# Patient Record
Sex: MALE | Race: WHITE
[De-identification: names, ages, dates, MRNs, and addresses within clinical notes are randomized per-mention and may not be internally consistent; named-entity substitution may affect disease eponyms.]

---

## 2017-01-09 ENCOUNTER — HOSPITAL ENCOUNTER (OUTPATIENT)
Dept: HOSPITAL 45 - C.LABSPEC | Age: 52
Discharge: HOME | End: 2017-01-09
Attending: INTERNAL MEDICINE
Payer: COMMERCIAL

## 2017-01-09 DIAGNOSIS — Z51.81: ICD-10-CM

## 2017-01-09 DIAGNOSIS — Z79.899: ICD-10-CM

## 2017-01-09 DIAGNOSIS — E78.5: ICD-10-CM

## 2017-01-09 DIAGNOSIS — E11.9: Primary | ICD-10-CM

## 2017-01-09 LAB
ANION GAP SERPL CALC-SCNC: 9 MMOL/L (ref 3–11)
BUN SERPL-MCNC: 7 MG/DL (ref 7–18)
BUN/CREAT SERPL: 11.5 (ref 10–20)
CALCIUM SERPL-MCNC: 9.1 MG/DL (ref 8.5–10.1)
CHLORIDE SERPL-SCNC: 102 MMOL/L (ref 98–107)
CHOLEST/HDLC SERPL: 3.9 {RATIO}
CO2 SERPL-SCNC: 30 MMOL/L (ref 21–32)
CREAT SERPL-MCNC: 0.6 MG/DL (ref 0.6–1.4)
EST. AVERAGE GLUCOSE BLD GHB EST-MCNC: 148 MG/DL
GLUCOSE SERPL-MCNC: 169 MG/DL (ref 70–99)
GLUCOSE UR QL: 50 MG/DL
NITRITE UR QL STRIP: 203 MG/DL (ref 0–150)
PH UR: 196 MG/DL (ref 0–200)
POTASSIUM SERPL-SCNC: 3.9 MMOL/L (ref 3.5–5.1)
SODIUM SERPL-SCNC: 141 MMOL/L (ref 136–145)
VERY LOW DENSITY LIPOPROT CALC: 41 MG/DL

## 2017-05-09 ENCOUNTER — HOSPITAL ENCOUNTER (OUTPATIENT)
Dept: HOSPITAL 45 - C.LABSPEC | Age: 52
Discharge: HOME | End: 2017-05-09
Attending: INTERNAL MEDICINE
Payer: COMMERCIAL

## 2017-05-09 DIAGNOSIS — E11.65: Primary | ICD-10-CM

## 2017-05-09 DIAGNOSIS — E78.5: ICD-10-CM

## 2017-05-09 DIAGNOSIS — G40.909: ICD-10-CM

## 2017-05-09 LAB
ALBUMIN/GLOB SERPL: 1.1 {RATIO} (ref 0.9–2)
ALP SERPL-CCNC: 176 U/L (ref 45–117)
ALT SERPL-CCNC: 41 U/L (ref 12–78)
ANION GAP SERPL CALC-SCNC: 9 MMOL/L (ref 3–11)
AST SERPL-CCNC: 18 U/L (ref 15–37)
BASOPHILS # BLD: 0.01 K/UL (ref 0–0.2)
BASOPHILS NFR BLD: 0.2 %
BUN SERPL-MCNC: 10 MG/DL (ref 7–18)
BUN/CREAT SERPL: 17.7 (ref 10–20)
CALCIUM SERPL-MCNC: 8.7 MG/DL (ref 8.5–10.1)
CHLORIDE SERPL-SCNC: 103 MMOL/L (ref 98–107)
CHOLEST/HDLC SERPL: 4.1 {RATIO}
CO2 SERPL-SCNC: 27 MMOL/L (ref 21–32)
COMPLETE: YES
CREAT SERPL-MCNC: 0.56 MG/DL (ref 0.6–1.4)
EOSINOPHIL NFR BLD AUTO: 201 K/UL (ref 130–400)
EST. AVERAGE GLUCOSE BLD GHB EST-MCNC: 151 MG/DL
GLOBULIN SER-MCNC: 3.3 GM/DL (ref 2.5–4)
GLUCOSE SERPL-MCNC: 151 MG/DL (ref 70–99)
GLUCOSE UR QL: 46 MG/DL
HCT VFR BLD CALC: 46.6 % (ref 42–52)
IG%: 0.3 %
IMM GRANULOCYTES NFR BLD AUTO: 23.4 %
LYMPHOCYTES # BLD: 1.47 K/UL (ref 1.2–3.4)
MCH RBC QN AUTO: 30.2 PG (ref 25–34)
MCHC RBC AUTO-ENTMCNC: 33.5 G/DL (ref 32–36)
MCV RBC AUTO: 90.1 FL (ref 80–100)
MONOCYTES NFR BLD: 10 %
NEUTROPHILS # BLD AUTO: 1 %
NEUTROPHILS NFR BLD AUTO: 65.1 %
NITRITE UR QL STRIP: 209 MG/DL (ref 0–150)
PH UR: 190 MG/DL (ref 0–200)
PMV BLD AUTO: 11.3 FL (ref 7.4–10.4)
POTASSIUM SERPL-SCNC: 3.9 MMOL/L (ref 3.5–5.1)
RBC # BLD AUTO: 5.17 M/UL (ref 4.7–6.1)
SODIUM SERPL-SCNC: 139 MMOL/L (ref 136–145)
VERY LOW DENSITY LIPOPROT CALC: 42 MG/DL
WBC # BLD AUTO: 6.29 K/UL (ref 4.8–10.8)

## 2017-06-13 ENCOUNTER — HOSPITAL ENCOUNTER (OUTPATIENT)
Dept: HOSPITAL 45 - C.RAD | Age: 52
Discharge: HOME | End: 2017-06-13
Attending: INTERNAL MEDICINE
Payer: COMMERCIAL

## 2017-06-13 DIAGNOSIS — R05: Primary | ICD-10-CM

## 2017-06-13 DIAGNOSIS — M54.6: ICD-10-CM

## 2017-06-13 NOTE — DIAGNOSTIC IMAGING REPORT
CHEST 2 VIEWS ROUTINE



CLINICAL HISTORY: COUGH, PAIN IN UPPER BACK dyspnea



COMPARISON STUDY:  4/27/2013



FINDINGS: The bones soft tissues and hemidiaphragms are normal. The

cardiomediastinal silhouette is normal. The lungs are clear. The pulmonary

vasculature is normal. 



IMPRESSION:  Negative chest. 









Electronically signed by:  Ben Rojas M.D.

6/13/2017 3:26 PM



Dictated Date/Time:  6/13/2017 3:26 PM

## 2017-06-13 NOTE — DIAGNOSTIC IMAGING REPORT
THORACIC SPINE 3 VIEWS



HISTORY: Dyspnea  COUGH, PAIN IN UPPER BACK



COMPARISON: None.



FINDINGS: There is no fracture.  No subluxation. Moderate degenerative disc

change throughout



IMPRESSION:  

Moderate degenerative disc change. No acute bony abnormality.







Electronically signed by:  Ben Rojas M.D.

6/13/2017 3:30 PM



Dictated Date/Time:  6/13/2017 3:29 PM

## 2017-09-08 ENCOUNTER — HOSPITAL ENCOUNTER (OUTPATIENT)
Dept: HOSPITAL 45 - C.LABSPEC | Age: 52
Discharge: HOME | End: 2017-09-08
Attending: INTERNAL MEDICINE
Payer: COMMERCIAL

## 2017-09-08 DIAGNOSIS — Z12.11: Primary | ICD-10-CM

## 2017-09-19 ENCOUNTER — HOSPITAL ENCOUNTER (OUTPATIENT)
Dept: HOSPITAL 45 - C.LABSPEC | Age: 52
Discharge: HOME | End: 2017-09-19
Attending: INTERNAL MEDICINE
Payer: COMMERCIAL

## 2017-09-19 DIAGNOSIS — E11.9: Primary | ICD-10-CM

## 2017-09-19 DIAGNOSIS — E78.5: ICD-10-CM

## 2017-09-19 LAB
ANION GAP SERPL CALC-SCNC: 8 MMOL/L (ref 3–11)
BUN SERPL-MCNC: 8 MG/DL (ref 7–18)
BUN/CREAT SERPL: 16.3 (ref 10–20)
CALCIUM SERPL-MCNC: 9.1 MG/DL (ref 8.5–10.1)
CHLORIDE SERPL-SCNC: 102 MMOL/L (ref 98–107)
CHOLEST/HDLC SERPL: 5.3 {RATIO}
CO2 SERPL-SCNC: 28 MMOL/L (ref 21–32)
CREAT SERPL-MCNC: 0.48 MG/DL (ref 0.6–1.4)
EST. AVERAGE GLUCOSE BLD GHB EST-MCNC: 137 MG/DL
GLUCOSE SERPL-MCNC: 143 MG/DL (ref 70–99)
GLUCOSE UR QL: 38 MG/DL
NITRITE UR QL STRIP: 172 MG/DL (ref 0–150)
PH UR: 201 MG/DL (ref 0–200)
POTASSIUM SERPL-SCNC: 4 MMOL/L (ref 3.5–5.1)
SODIUM SERPL-SCNC: 138 MMOL/L (ref 136–145)
VERY LOW DENSITY LIPOPROT CALC: 34 MG/DL

## 2017-10-10 ENCOUNTER — HOSPITAL ENCOUNTER (OUTPATIENT)
Dept: HOSPITAL 45 - C.LABSPEC | Age: 52
Discharge: HOME | End: 2017-10-10
Attending: INTERNAL MEDICINE
Payer: COMMERCIAL

## 2017-10-10 DIAGNOSIS — E11.9: Primary | ICD-10-CM

## 2017-10-10 LAB
CREAT UR-MCNC: 210 MG/DL
RATIO: 7.4 MCG/MG (ref 0–30)

## 2018-01-10 ENCOUNTER — HOSPITAL ENCOUNTER (OUTPATIENT)
Dept: HOSPITAL 45 - C.LABSPEC | Age: 53
Discharge: HOME | End: 2018-01-10
Attending: INTERNAL MEDICINE
Payer: COMMERCIAL

## 2018-01-10 DIAGNOSIS — G40.909: ICD-10-CM

## 2018-01-10 DIAGNOSIS — I70.91: ICD-10-CM

## 2018-01-10 DIAGNOSIS — E11.9: Primary | ICD-10-CM

## 2018-01-10 LAB
ALBUMIN SERPL-MCNC: 3.4 GM/DL (ref 3.4–5)
ALP SERPL-CCNC: 226 U/L (ref 45–117)
ALT SERPL-CCNC: 42 U/L (ref 12–78)
AST SERPL-CCNC: 19 U/L (ref 15–37)
BASOPHILS # BLD: 0.01 K/UL (ref 0–0.2)
BASOPHILS NFR BLD: 0.2 %
BUN SERPL-MCNC: 7 MG/DL (ref 7–18)
CALCIUM SERPL-MCNC: 9.1 MG/DL (ref 8.5–10.1)
CO2 SERPL-SCNC: 30 MMOL/L (ref 21–32)
CREAT SERPL-MCNC: 0.51 MG/DL (ref 0.6–1.4)
EOS ABS #: 0.02 K/UL (ref 0–0.5)
EOSINOPHIL NFR BLD AUTO: 206 K/UL (ref 130–400)
GLUCOSE SERPL-MCNC: 136 MG/DL (ref 70–99)
HBA1C MFR BLD: 6.4 % (ref 4.5–5.6)
HCT VFR BLD CALC: 43.6 % (ref 42–52)
HGB BLD-MCNC: 14.4 G/DL (ref 14–18)
IG#: 0.02 K/UL (ref 0–0.02)
IMM GRANULOCYTES NFR BLD AUTO: 17.9 %
LDL CHOLESTEROL (DIRECT): 146 MG/DL
LYMPHOCYTES # BLD: 1.06 K/UL (ref 1.2–3.4)
MCH RBC QN AUTO: 29.1 PG (ref 25–34)
MCHC RBC AUTO-ENTMCNC: 33 G/DL (ref 32–36)
MCV RBC AUTO: 88.3 FL (ref 80–100)
MONO ABS #: 0.76 K/UL (ref 0.11–0.59)
MONOCYTES NFR BLD: 12.9 %
NEUT ABS #: 4.04 K/UL (ref 1.4–6.5)
NEUTROPHILS # BLD AUTO: 0.3 %
NEUTROPHILS NFR BLD AUTO: 68.4 %
PH UR: 202 MG/DL (ref 0–200)
PMV BLD AUTO: 10.3 FL (ref 7.4–10.4)
POTASSIUM SERPL-SCNC: 4 MMOL/L (ref 3.5–5.1)
PROT SERPL-MCNC: 7.3 GM/DL (ref 6.4–8.2)
RED CELL DISTRIBUTION WIDTH CV: 14.2 % (ref 11.5–14.5)
RED CELL DISTRIBUTION WIDTH SD: 45.7 FL (ref 36.4–46.3)
SODIUM SERPL-SCNC: 136 MMOL/L (ref 136–145)
WBC # BLD AUTO: 5.91 K/UL (ref 4.8–10.8)

## 2018-02-21 ENCOUNTER — HOSPITAL ENCOUNTER (OUTPATIENT)
Dept: HOSPITAL 45 - C.LABSPEC | Age: 53
Discharge: HOME | End: 2018-02-21
Attending: INTERNAL MEDICINE
Payer: COMMERCIAL

## 2018-02-21 DIAGNOSIS — R63.4: ICD-10-CM

## 2018-02-21 DIAGNOSIS — R10.9: ICD-10-CM

## 2018-02-21 DIAGNOSIS — E11.9: Primary | ICD-10-CM

## 2018-02-21 LAB
ALBUMIN SERPL-MCNC: 3 GM/DL (ref 3.4–5)
ALP SERPL-CCNC: 186 U/L (ref 45–117)
ALT SERPL-CCNC: 129 U/L (ref 12–78)
AST SERPL-CCNC: 93 U/L (ref 15–37)
BASOPHILS # BLD: 0.01 K/UL (ref 0–0.2)
BASOPHILS NFR BLD: 0.2 %
BUN SERPL-MCNC: 9 MG/DL (ref 7–18)
CALCIUM SERPL-MCNC: 9 MG/DL (ref 8.5–10.1)
CO2 SERPL-SCNC: 29 MMOL/L (ref 21–32)
CREAT SERPL-MCNC: 0.62 MG/DL (ref 0.6–1.4)
EOS ABS #: 0.01 K/UL (ref 0–0.5)
EOSINOPHIL NFR BLD AUTO: 287 K/UL (ref 130–400)
GLUCOSE SERPL-MCNC: 74 MG/DL (ref 70–99)
HCT VFR BLD CALC: 40.2 % (ref 42–52)
HGB BLD-MCNC: 13.2 G/DL (ref 14–18)
IG#: 0.02 K/UL (ref 0–0.02)
IMM GRANULOCYTES NFR BLD AUTO: 14.6 %
LIPASE: 132 U/L (ref 73–393)
LYMPHOCYTES # BLD: 0.86 K/UL (ref 1.2–3.4)
MCH RBC QN AUTO: 28 PG (ref 25–34)
MCHC RBC AUTO-ENTMCNC: 32.8 G/DL (ref 32–36)
MCV RBC AUTO: 85.4 FL (ref 80–100)
MONO ABS #: 0.84 K/UL (ref 0.11–0.59)
MONOCYTES NFR BLD: 14.2 %
NEUT ABS #: 4.17 K/UL (ref 1.4–6.5)
NEUTROPHILS # BLD AUTO: 0.2 %
NEUTROPHILS NFR BLD AUTO: 70.5 %
PMV BLD AUTO: 9.8 FL (ref 7.4–10.4)
POTASSIUM SERPL-SCNC: 3.9 MMOL/L (ref 3.5–5.1)
PROT SERPL-MCNC: 7.6 GM/DL (ref 6.4–8.2)
RED CELL DISTRIBUTION WIDTH CV: 14.1 % (ref 11.5–14.5)
RED CELL DISTRIBUTION WIDTH SD: 44.7 FL (ref 36.4–46.3)
SODIUM SERPL-SCNC: 132 MMOL/L (ref 136–145)
WBC # BLD AUTO: 5.91 K/UL (ref 4.8–10.8)

## 2018-02-22 ENCOUNTER — HOSPITAL ENCOUNTER (OUTPATIENT)
Dept: HOSPITAL 45 - C.CTS | Age: 53
Discharge: HOME | End: 2018-02-22
Attending: INTERNAL MEDICINE
Payer: COMMERCIAL

## 2018-02-22 ENCOUNTER — HOSPITAL ENCOUNTER (OUTPATIENT)
Dept: HOSPITAL 45 - C.LABSPEC | Age: 53
Discharge: HOME | End: 2018-02-22
Attending: INTERNAL MEDICINE
Payer: COMMERCIAL

## 2018-02-22 DIAGNOSIS — R63.4: ICD-10-CM

## 2018-02-22 DIAGNOSIS — R10.9: Primary | ICD-10-CM

## 2018-02-22 DIAGNOSIS — C85.90: Primary | ICD-10-CM

## 2018-02-22 LAB
HBA1C MFR BLD: 6.6 % (ref 4.5–5.6)
INR PPP: 1 (ref 0.9–1.1)
PTT PATIENT: 29.6 SECONDS (ref 21–31)

## 2018-02-22 NOTE — DIAGNOSTIC IMAGING REPORT
ABD/PELVIS IV AND ORAL CONT



CT DOSE: 915.13 mGycm



HISTORY: Pain.  ABDOMINAL PAIN, WEIGHT LOSS



TECHNIQUE: Multiaxial CT images of the abdomen and pelvis were performed

following the use of intravenous and oral contrast.  A dose lowering technique

was utilized adhering to the principles of ALARA.





COMPARISON STUDY: 11/29/2011



FINDINGS: Lung bases are considered clear. Liver is uniform throughout. Spleen

is uniform with no evidence for enlargement.



There are findings of marked adenopathy within the retroperitoneal and

periaortic region. The largest node measures 6.2 cm and displaces the pancreatic

head and uncinate process and anterior fashion.



Bulky smaller nodes are identified throughout the para-aortic and

retroperitoneal region extending to the level of the aortic bifurcation. The

kidneys enhance uniformly. No evidence for hydronephrosis.



Bowel pattern is considered nonobstructive. The appendix is normal. Subtle

infiltration of the root of the mesentery is also present. Graft evaluation of

the pelvis shows the bladder to be midline. Colonic bowel pattern is considered

nonobstructive. Inguinal regions are unremarkable.



IMPRESSION: 



1. Massive adenopathy of the upper abdominal, para, and retroperitoneal regions.





2. This is considerable change compared to the prior study of 11/29/2011. 

3. Lymphoma is considered the diagnosis of exclusion.









The above report was generated using voice recognition software.  It may contain

grammatical, syntax or spelling errors.







Electronically signed by:  Ben Rojas M.D.

2/22/2018 2:35 PM



Dictated Date/Time:  2/22/2018 2:21 PM

## 2018-02-27 ENCOUNTER — HOSPITAL ENCOUNTER (OUTPATIENT)
Dept: HOSPITAL 45 - C.CTS | Age: 53
Discharge: HOME | End: 2018-02-27
Attending: INTERNAL MEDICINE
Payer: COMMERCIAL

## 2018-02-27 DIAGNOSIS — C85.90: Primary | ICD-10-CM

## 2018-02-27 NOTE — DIAGNOSTIC IMAGING REPORT
CT OF THE CHEST WITH IV CONTRAST



CLINICAL HISTORY: LYMPHOMA    



COMPARISON STUDY:  Chest x-ray dated 6/13/2017 



TECHNIQUE:  Following the IV administration of 95 mL of Optiray-320, CT of the

thorax was performed from the thoracic inlet to the lung bases. Images are

reviewed in the axial, sagittal, and coronal planes. IV contrast was

administered without complication.  A dose lowering technique was utilized

adhering to the principles of ALARA.





CT DOSE: 407.24 mGy.cm



FINDINGS:



Thyroid: Imaged portions of the thyroid gland are normal in appearance.



Thoracic aorta: The thoracic aorta is normal in course and caliber, noting

standard 3-vessel arch anatomy. No aneurysm or dissection is seen.



Pulmonary vasculature: The pulmonary trunk is normal in caliber. There are no

central filling defects identified to suggest pulmonary embolus. Note that this

examination was not protocoled for the evaluation of pulmonary emboli.



HEART: The heart is normal in size and configuration, without pericardial

effusion.



Lungs and pleural spaces: There is respiratory motion artifact. There is no

focal pulmonary consolidation. There are no suspicious pulmonary masses.



Mediastinum: There is a right paratracheal lymph node the upper limits of normal

in diameter. There is a mildly enlarged 12 mm subcarinal lymph node. There are

enlarged retrocrural lymph the upper abdomen.



Chela: Clear.



Axilla: There is no evidence of pathologic axillary lymphadenopathy



Upper abdomen:  There is bulky belkis hepatis and retroperitoneal para-aortic

adenopathy



Skeletal structures: There are no lytic or blastic osseous lesions.



IMPRESSION:  

1. Bulky adenopathy within the upper abdomen

2. Mildly enlarged mediastinal lymph nodes

3. No evidence of focal pulmonary consolidation. No suspicious pulmonary masses.







Electronically signed by:  Darrius Burch M.D.

2/27/2018 10:39 AM



Dictated Date/Time:  2/27/2018 10:35 AM

## 2018-03-02 NOTE — CODING QUERY NO DIAGNOSIS
TREATMENT RENDERED WITHOUT A DIAGNOSIS                                                  



To promote full compliance with coding requirements relating to patient care, physician 
participation is requested in all cases of  uncertainty.  Please assist us with 
providing a diagnosis/symptom for the test(s) below:



A diagnosis/symptom was not documented on your Order.  A valid diagnosis/symptom is 
required to bill all insurances.



**Please remember that we are unable to code a diagnosis of rule out, probable, possible, 
questionable, or suspected.  



Tests that require a diagnosis:

DOS: 2/22/18



* PROTHROMBIN TIME PRO                      DIAGNOSIS:

* PTT                                  DIAGNOSIS:

* LDH                                  DIAGNOSIS:











Provider Signature: _____________________________ Date: _________



Thank you  

Carmen Louis

[employer]

Phone:  [phone]

Fax:  [phone]



Once completed, please kindly fax back to [phone]



For questions please call [phone]

## 2018-03-14 ENCOUNTER — HOSPITAL ENCOUNTER (OUTPATIENT)
Dept: HOSPITAL 45 - C.CPL | Age: 53
Discharge: HOME | End: 2018-03-14
Attending: SURGERY
Payer: COMMERCIAL

## 2018-03-14 DIAGNOSIS — Z01.810: Primary | ICD-10-CM

## 2018-03-14 DIAGNOSIS — D48.9: ICD-10-CM

## 2018-03-14 DIAGNOSIS — R19.00: ICD-10-CM

## 2018-03-19 ENCOUNTER — HOSPITAL ENCOUNTER (OUTPATIENT)
Dept: HOSPITAL 45 - C.PET | Age: 53
Discharge: HOME | End: 2018-03-19
Attending: INTERNAL MEDICINE
Payer: COMMERCIAL

## 2018-03-19 DIAGNOSIS — C80.1: Primary | ICD-10-CM

## 2018-03-19 NOTE — DIAGNOSTIC IMAGING REPORT
PET/CT



HISTORY:      Lymphadenopathy.



TECHNIQUE: PET/CT was performed from the base of the skull through the pelvis

following the intravenous administration of 10.9 mCi of F18-FDG. Non-contrast CT

imaging was performed over the same range without breath-hold for attenuation

correction of PET images and anatomic correlation, but not for primary

interpretation as it is not of standard diagnostic quality.



CT DOSE: 414.35 mGycm

 

COMPARISON: Chest abdomen and pelvis CT 2/27/2018.



FINDINGS:

 

HEAD AND NECK:  Encephalomalacia seen within the visualized portion of the right

frontal lobe consistent with an old infarct. There are few scattered FDG avid

cervical lymph noted. Dominant lymph node within the left neck base measures 2.9

x 1.7 cm and demonstrates an SUV max of 15.6. There is a 1 cm exophytic

nodule/polyp at the left nasopharynx which demonstrates FDG uptake with an SUV

max of 8. Abnormal FDG uptake also identified within the bilateral lateral

pharyngeal lymph nodes. Focal areas of nonspecific FDG uptake seen within the

right masseter, temporalis, and left pterygoid muscles.

 

CHEST:  No FDG avid or suspicious pulmonary nodules. There are are a few

scattered FDG avid mediastinal and bilateral hilar lymph nodes which are mildly

enlarged. Dominant periesophageal lymph node on image 90 measures 1.8 cm and

demonstrates an SUV max of 12. These have increased in size.

 

ABDOMEN/PELVIS:  There is again noted bulky lymphadenopathy seen predominantly

within the retroperitoneum which has slightly progressed. Dominant lymph node

measures 6.6 cm. SUV max is 14. Mild abnormal FDG uptake within the spleen which

is increased in size measuring 16 cm. This demonstrates an SUV max of 5. There

are few FDG avid bilateral common iliac and right external iliac lymph nodes.

 

MUSCULOSKELETAL:  Multiple scattered foci of FDG uptake seen within the spine,

right scapula, manubrium/sternum, pelvis consistent with metastatic foci. No

corresponding lesion on CT at this time.

 

IMPRESSION:  

1. Multiple FDG avid cervical, mediastinal, hilar, abdominal, and pelvic lymph

nodes as described above. This is most pronounced within the retroperitoneal

space of the abdomen. This favors a lymphoma.

2. Splenomegaly with abnormal FDG uptake consistent with additional site of

disease.

3. Multiple scattered foci of abnormal FDG uptake within the visualized osseous

structures as described above. There is no corresponding lesion on CT at this

time. However, these are consistent with additional sites of tumor.

4. A 1 cm FDG avid exophytic nodule within the left nasopharynx. This could also

represent lymphoma involvement. However, direct visualization is recommended for

further evaluation.

5. A few scattered foci of abnormal FDG uptake within the  muscles as

described above. This could be physiologic or represent an additional site of

lymphoma.







Electronically signed by:  Yo Holt M.D.

3/19/2018 1:44 PM



Dictated Date/Time:  3/19/2018 1:22 PM

## 2018-03-20 ENCOUNTER — HOSPITAL ENCOUNTER (INPATIENT)
Dept: HOSPITAL 45 - C.ACU | Age: 53
LOS: 9 days | Discharge: HOME HEALTH | DRG: 823 | End: 2018-03-29
Attending: FAMILY MEDICINE | Admitting: SURGERY
Payer: COMMERCIAL

## 2018-03-20 VITALS
OXYGEN SATURATION: 93 % | SYSTOLIC BLOOD PRESSURE: 115 MMHG | TEMPERATURE: 99.14 F | DIASTOLIC BLOOD PRESSURE: 79 MMHG | HEART RATE: 115 BPM

## 2018-03-20 VITALS — SYSTOLIC BLOOD PRESSURE: 122 MMHG | OXYGEN SATURATION: 100 % | DIASTOLIC BLOOD PRESSURE: 83 MMHG | HEART RATE: 102 BPM

## 2018-03-20 VITALS
OXYGEN SATURATION: 98 % | HEART RATE: 105 BPM | DIASTOLIC BLOOD PRESSURE: 85 MMHG | SYSTOLIC BLOOD PRESSURE: 132 MMHG | TEMPERATURE: 98.24 F

## 2018-03-20 VITALS
SYSTOLIC BLOOD PRESSURE: 114 MMHG | HEART RATE: 100 BPM | OXYGEN SATURATION: 95 % | DIASTOLIC BLOOD PRESSURE: 78 MMHG | TEMPERATURE: 97.88 F

## 2018-03-20 VITALS
HEART RATE: 50 BPM | OXYGEN SATURATION: 96 % | TEMPERATURE: 97.88 F | DIASTOLIC BLOOD PRESSURE: 79 MMHG | SYSTOLIC BLOOD PRESSURE: 114 MMHG

## 2018-03-20 VITALS
TEMPERATURE: 98.42 F | HEART RATE: 91 BPM | OXYGEN SATURATION: 98 % | SYSTOLIC BLOOD PRESSURE: 119 MMHG | DIASTOLIC BLOOD PRESSURE: 83 MMHG

## 2018-03-20 VITALS
SYSTOLIC BLOOD PRESSURE: 119 MMHG | TEMPERATURE: 99.5 F | HEART RATE: 99 BPM | DIASTOLIC BLOOD PRESSURE: 84 MMHG | OXYGEN SATURATION: 95 %

## 2018-03-20 VITALS
WEIGHT: 172.36 LBS | WEIGHT: 172.36 LBS | BODY MASS INDEX: 26.12 KG/M2 | BODY MASS INDEX: 26.12 KG/M2 | BODY MASS INDEX: 26.12 KG/M2 | HEIGHT: 68 IN | HEIGHT: 68 IN | BODY MASS INDEX: 26.12 KG/M2

## 2018-03-20 DIAGNOSIS — H40.9: ICD-10-CM

## 2018-03-20 DIAGNOSIS — Z86.73: ICD-10-CM

## 2018-03-20 DIAGNOSIS — Z80.1: ICD-10-CM

## 2018-03-20 DIAGNOSIS — E43: ICD-10-CM

## 2018-03-20 DIAGNOSIS — G62.9: ICD-10-CM

## 2018-03-20 DIAGNOSIS — D62: ICD-10-CM

## 2018-03-20 DIAGNOSIS — E87.1: ICD-10-CM

## 2018-03-20 DIAGNOSIS — E11.40: ICD-10-CM

## 2018-03-20 DIAGNOSIS — Z79.82: ICD-10-CM

## 2018-03-20 DIAGNOSIS — Z83.3: ICD-10-CM

## 2018-03-20 DIAGNOSIS — R50.82: ICD-10-CM

## 2018-03-20 DIAGNOSIS — C81.90: Primary | ICD-10-CM

## 2018-03-20 DIAGNOSIS — E88.09: ICD-10-CM

## 2018-03-20 DIAGNOSIS — F32.9: ICD-10-CM

## 2018-03-20 DIAGNOSIS — E56.1: ICD-10-CM

## 2018-03-20 DIAGNOSIS — G81.94: ICD-10-CM

## 2018-03-20 DIAGNOSIS — N40.1: ICD-10-CM

## 2018-03-20 DIAGNOSIS — E78.00: ICD-10-CM

## 2018-03-20 DIAGNOSIS — G40.909: ICD-10-CM

## 2018-03-20 DIAGNOSIS — Z87.891: ICD-10-CM

## 2018-03-20 DIAGNOSIS — R74.0: ICD-10-CM

## 2018-03-20 RX ADMIN — SERTRALINE HYDROCHLORIDE SCH MG: 50 TABLET, FILM COATED ORAL at 20:54

## 2018-03-20 RX ADMIN — EXTENDED PHENYTOIN SODIUM SCH MG: 100 CAPSULE ORAL at 20:55

## 2018-03-20 RX ADMIN — INSULIN ASPART SCH UNITS: 100 INJECTION, SOLUTION INTRAVENOUS; SUBCUTANEOUS at 21:16

## 2018-03-20 RX ADMIN — FENTANYL CITRATE PRN MCG: 50 INJECTION, SOLUTION INTRAMUSCULAR; INTRAVENOUS at 14:08

## 2018-03-20 RX ADMIN — INSULIN ASPART SCH UNITS: 100 INJECTION, SOLUTION INTRAVENOUS; SUBCUTANEOUS at 17:15

## 2018-03-20 RX ADMIN — FENTANYL CITRATE PRN MCG: 50 INJECTION, SOLUTION INTRAMUSCULAR; INTRAVENOUS at 14:01

## 2018-03-20 RX ADMIN — SODIUM CHLORIDE, SODIUM LACTATE, POTASSIUM CHLORIDE, AND CALCIUM CHLORIDE SCH MLS/HR: 600; 310; 30; 20 INJECTION, SOLUTION INTRAVENOUS at 17:50

## 2018-03-20 RX ADMIN — GABAPENTIN SCH MG: 300 CAPSULE ORAL at 20:55

## 2018-03-20 RX ADMIN — OXYCODONE HYDROCHLORIDE PRN MG: 5 TABLET ORAL at 20:54

## 2018-03-20 RX ADMIN — TAMSULOSIN HYDROCHLORIDE SCH MG: 0.4 CAPSULE ORAL at 20:54

## 2018-03-20 RX ADMIN — OXYCODONE HYDROCHLORIDE PRN MG: 5 TABLET ORAL at 15:56

## 2018-03-20 NOTE — ANESTHESIOLOGY PROGRESS NOTE
Anesthesia Post Op Note


Date & Time


Mar 20, 2018 at 14:38





Vital Signs


Pain Intensity:  3





Vital Signs Past 12 Hours








  Date Time  Temp Pulse Resp B/P (MAP) Pulse Ox O2 Delivery O2 Flow Rate FiO2


 


3/20/18 14:20 37.1 99 20 118/84 97 Nasal Cannula 2 


 


3/20/18 14:10  95 12 123/84 97 Nasal Cannula 2 


 


3/20/18 14:00  94 16 128/81 98 Nasal Cannula 2 


 


3/20/18 13:50  89 17 121/87 100 Oxymask 10 


 


3/20/18 13:40  93 14 118/81 100 Oxymask 10 


 


3/20/18 13:34 37.2 91 15 126/84 100 Oxymask 10 


 


3/20/18 08:18 37.5 99 20 119/84 (96) 95 Room Air  











Notes


Mental Status:  alert / awake / arousable, participated in evaluation


Pt Amnestic to Procedure:  Yes


Nausea / Vomiting:  adequately controlled


Pain:  adequately controlled


Airway Patency, RR, SpO2:  stable & adequate


BP & HR:  stable & adequate


Hydration State:  stable & adequate


Anesthetic Complications:  no major complications apparent

## 2018-03-20 NOTE — [facility] POST OPERATIVE BRIEF NOTE
Immediate Operative Summary


Operative Date


Mar 20, 2018.





Pre-Operative Diagnosis





Left lower back mass, need for IV fluids, retroperitoneal mass





Post-Operative Diagnosis





Same





Procedure(s) Performed





Laparoscopic attempted Biopsy of Retroperitoneal Mass convert to Open; A-Port 


Placement Left Jugular; Excisional Biopsy of 2cm soft tissue mass Lef tLumbar 


Mass





Surgeon


Dr Nichole





Assistant Surgeon(s)


Hieu Wei PA-C





Estimated Blood Loss


30ml





Findings


See Below


as preop





Specimens





Frozen section #1 retroperitoneal mass taken to pathology by Dr Nichole at 


1147





Frozen section #2 retroperitoneal mass taken to pathology by Dr Nichole at 


1047











A. Left lumbar mass

## 2018-03-20 NOTE — DIAGNOSTIC IMAGING REPORT
CHEST ONE VIEW PORTABLE



CLINICAL HISTORY: 52 years-old Male presenting with port placement. 



TECHNIQUE: Portable upright AP view of the chest was obtained.



COMPARISON: 6/13/2017.



FINDINGS:

Left subclavian Mediport terminates in the right atrium. Cardiac silhouette top

normal in size. Mildly low lung volumes with hypoventilatory changes. Bandlike

opacity in the right mid lung likely atelectasis. No large pleural effusion or

pneumothorax. A loose body may be present in the right glenohumeral joint. Upper

abdomen normal.



IMPRESSION:

1.  Status post left subclavian Mediport placement, which is appropriately

positioned. No pneumothorax.



2.  Mildly low lung volumes with hypoventilatory changes and atelectasis.







Electronically signed by:  Daniel June M.D.

3/20/2018 2:26 PM



Dictated Date/Time:  3/20/2018 2:25 PM

## 2018-03-20 NOTE — HISTORY & PHYSICAL BRIDGE NOTE
H&P Re-Evaluation


Bridge Note:


I have examined the patient, reviewed the History & Physical and in the 

interval since the performance of the History & Physical I have noted the 

following changes of clinical significance: No changes noted temp 37.7 (

repeated x1) pt voices no new complaints, discussed with anesthesia and Dr Baron Carrera and family will proceed with surgery, T elevation could also be due to 

lymphoma

## 2018-03-21 VITALS
OXYGEN SATURATION: 91 % | HEART RATE: 117 BPM | TEMPERATURE: 99.14 F | DIASTOLIC BLOOD PRESSURE: 83 MMHG | SYSTOLIC BLOOD PRESSURE: 122 MMHG

## 2018-03-21 VITALS
OXYGEN SATURATION: 91 % | TEMPERATURE: 99.14 F | HEART RATE: 114 BPM | DIASTOLIC BLOOD PRESSURE: 86 MMHG | SYSTOLIC BLOOD PRESSURE: 129 MMHG

## 2018-03-21 VITALS
HEART RATE: 125 BPM | SYSTOLIC BLOOD PRESSURE: 106 MMHG | OXYGEN SATURATION: 93 % | TEMPERATURE: 99.86 F | DIASTOLIC BLOOD PRESSURE: 81 MMHG

## 2018-03-21 VITALS
DIASTOLIC BLOOD PRESSURE: 83 MMHG | HEART RATE: 118 BPM | OXYGEN SATURATION: 94 % | TEMPERATURE: 99.5 F | SYSTOLIC BLOOD PRESSURE: 137 MMHG

## 2018-03-21 VITALS
HEART RATE: 117 BPM | DIASTOLIC BLOOD PRESSURE: 86 MMHG | OXYGEN SATURATION: 91 % | TEMPERATURE: 99.14 F | SYSTOLIC BLOOD PRESSURE: 127 MMHG

## 2018-03-21 LAB
ALBUMIN SERPL-MCNC: 1.9 GM/DL (ref 3.4–5)
ALP SERPL-CCNC: 385 U/L (ref 45–117)
ALT SERPL-CCNC: 331 U/L (ref 12–78)
AST SERPL-CCNC: 411 U/L (ref 15–37)
BASOPHILS # BLD: 0 K/UL (ref 0–0.2)
BASOPHILS # BLD: 0.01 K/UL (ref 0–0.2)
BASOPHILS NFR BLD: 0 %
BASOPHILS NFR BLD: 0.2 %
BUN SERPL-MCNC: 5 MG/DL (ref 7–18)
BUN SERPL-MCNC: 5 MG/DL (ref 7–18)
CALCIUM SERPL-MCNC: 8.3 MG/DL (ref 8.5–10.1)
CALCIUM SERPL-MCNC: 8.4 MG/DL (ref 8.5–10.1)
CO2 SERPL-SCNC: 26 MMOL/L (ref 21–32)
CO2 SERPL-SCNC: 27 MMOL/L (ref 21–32)
CREAT SERPL-MCNC: 0.34 MG/DL (ref 0.6–1.4)
CREAT SERPL-MCNC: 0.44 MG/DL (ref 0.6–1.4)
EOS ABS #: 0 K/UL (ref 0–0.5)
EOS ABS #: 0.01 K/UL (ref 0–0.5)
EOSINOPHIL NFR BLD AUTO: 265 K/UL (ref 130–400)
EOSINOPHIL NFR BLD AUTO: 288 K/UL (ref 130–400)
GLUCOSE SERPL-MCNC: 156 MG/DL (ref 70–99)
GLUCOSE SERPL-MCNC: 169 MG/DL (ref 70–99)
HCT VFR BLD CALC: 37 % (ref 42–52)
HCT VFR BLD CALC: 39.1 % (ref 42–52)
HGB BLD-MCNC: 12.1 G/DL (ref 14–18)
HGB BLD-MCNC: 13 G/DL (ref 14–18)
IG#: 0.02 K/UL (ref 0–0.02)
IG#: 0.02 K/UL (ref 0–0.02)
IMM GRANULOCYTES NFR BLD AUTO: 3.6 %
IMM GRANULOCYTES NFR BLD AUTO: 6 %
LYMPHOCYTES # BLD: 0.23 K/UL (ref 1.2–3.4)
LYMPHOCYTES # BLD: 0.33 K/UL (ref 1.2–3.4)
MCH RBC QN AUTO: 26.8 PG (ref 25–34)
MCH RBC QN AUTO: 27.5 PG (ref 25–34)
MCHC RBC AUTO-ENTMCNC: 32.7 G/DL (ref 32–36)
MCHC RBC AUTO-ENTMCNC: 33.2 G/DL (ref 32–36)
MCV RBC AUTO: 81.9 FL (ref 80–100)
MCV RBC AUTO: 82.8 FL (ref 80–100)
MONO ABS #: 0.46 K/UL (ref 0.11–0.59)
MONO ABS #: 0.8 K/UL (ref 0.11–0.59)
MONOCYTES NFR BLD: 12.5 %
MONOCYTES NFR BLD: 8.4 %
NEUT ABS #: 4.65 K/UL (ref 1.4–6.5)
NEUT ABS #: 5.34 K/UL (ref 1.4–6.5)
NEUTROPHILS # BLD AUTO: 0 %
NEUTROPHILS # BLD AUTO: 0.2 %
NEUTROPHILS NFR BLD AUTO: 83.4 %
NEUTROPHILS NFR BLD AUTO: 85 %
PMV BLD AUTO: 9 FL (ref 7.4–10.4)
PMV BLD AUTO: 9.3 FL (ref 7.4–10.4)
POTASSIUM SERPL-SCNC: 4.1 MMOL/L (ref 3.5–5.1)
POTASSIUM SERPL-SCNC: 4.5 MMOL/L (ref 3.5–5.1)
PROT SERPL-MCNC: 6.4 GM/DL (ref 6.4–8.2)
RED CELL DISTRIBUTION WIDTH CV: 14.7 % (ref 11.5–14.5)
RED CELL DISTRIBUTION WIDTH CV: 14.8 % (ref 11.5–14.5)
RED CELL DISTRIBUTION WIDTH SD: 44.1 FL (ref 36.4–46.3)
RED CELL DISTRIBUTION WIDTH SD: 45.3 FL (ref 36.4–46.3)
SODIUM RANDOM URINE: 38 MEQ/L
SODIUM SERPL-SCNC: 128 MMOL/L (ref 136–145)
SODIUM SERPL-SCNC: 130 MMOL/L (ref 136–145)
T4 FREE SERPL-MCNC: 719 MOMS/KG (ref 500–800)
WBC # BLD AUTO: 5.47 K/UL (ref 4.8–10.8)
WBC # BLD AUTO: 6.4 K/UL (ref 4.8–10.8)

## 2018-03-21 PROCEDURE — 0JB80ZX EXCISION OF ABDOMEN SUBCUTANEOUS TISSUE AND FASCIA, OPEN APPROACH, DIAGNOSTIC: ICD-10-PCS | Performed by: SURGERY

## 2018-03-21 PROCEDURE — 0WBH0ZX EXCISION OF RETROPERITONEUM, OPEN APPROACH, DIAGNOSTIC: ICD-10-PCS | Performed by: SURGERY

## 2018-03-21 PROCEDURE — 05HN03Z INSERTION OF INFUSION DEVICE INTO LEFT INTERNAL JUGULAR VEIN, OPEN APPROACH: ICD-10-PCS | Performed by: SURGERY

## 2018-03-21 PROCEDURE — 0JB70ZX EXCISION OF BACK SUBCUTANEOUS TISSUE AND FASCIA, OPEN APPROACH, DIAGNOSTIC: ICD-10-PCS | Performed by: SURGERY

## 2018-03-21 RX ADMIN — GABAPENTIN SCH MG: 300 CAPSULE ORAL at 13:35

## 2018-03-21 RX ADMIN — SODIUM CHLORIDE AND POTASSIUM CHLORIDE SCH MLS/HR: 9; 1.49 INJECTION, SOLUTION INTRAVENOUS at 19:17

## 2018-03-21 RX ADMIN — INSULIN ASPART SCH UNITS: 100 INJECTION, SOLUTION INTRAVENOUS; SUBCUTANEOUS at 21:53

## 2018-03-21 RX ADMIN — MORPHINE SULFATE PRN MG: 4 INJECTION, SOLUTION INTRAMUSCULAR; INTRAVENOUS at 03:13

## 2018-03-21 RX ADMIN — MORPHINE SULFATE PRN MG: 4 INJECTION, SOLUTION INTRAMUSCULAR; INTRAVENOUS at 18:31

## 2018-03-21 RX ADMIN — SODIUM CHLORIDE AND POTASSIUM CHLORIDE SCH MLS/HR: 9; 1.49 INJECTION, SOLUTION INTRAVENOUS at 10:37

## 2018-03-21 RX ADMIN — GABAPENTIN SCH MG: 300 CAPSULE ORAL at 21:19

## 2018-03-21 RX ADMIN — MORPHINE SULFATE PRN MG: 4 INJECTION, SOLUTION INTRAMUSCULAR; INTRAVENOUS at 23:31

## 2018-03-21 RX ADMIN — OXYCODONE HYDROCHLORIDE PRN MG: 5 TABLET ORAL at 19:18

## 2018-03-21 RX ADMIN — TIMOLOL MALEATE SCH DROPS: 5 SOLUTION OPHTHALMIC at 08:46

## 2018-03-21 RX ADMIN — INSULIN ASPART SCH UNITS: 100 INJECTION, SOLUTION INTRAVENOUS; SUBCUTANEOUS at 17:46

## 2018-03-21 RX ADMIN — MORPHINE SULFATE PRN MG: 4 INJECTION, SOLUTION INTRAMUSCULAR; INTRAVENOUS at 06:41

## 2018-03-21 RX ADMIN — SODIUM CHLORIDE, SODIUM LACTATE, POTASSIUM CHLORIDE, AND CALCIUM CHLORIDE SCH MLS/HR: 600; 310; 30; 20 INJECTION, SOLUTION INTRAVENOUS at 05:50

## 2018-03-21 RX ADMIN — INSULIN ASPART SCH UNITS: 100 INJECTION, SOLUTION INTRAVENOUS; SUBCUTANEOUS at 12:56

## 2018-03-21 RX ADMIN — PANTOPRAZOLE SCH MG: 40 TABLET, DELAYED RELEASE ORAL at 08:45

## 2018-03-21 RX ADMIN — INSULIN ASPART SCH UNITS: 100 INJECTION, SOLUTION INTRAVENOUS; SUBCUTANEOUS at 08:53

## 2018-03-21 RX ADMIN — OXYCODONE HYDROCHLORIDE PRN MG: 5 TABLET ORAL at 05:48

## 2018-03-21 RX ADMIN — Medication SCH MG: at 08:45

## 2018-03-21 RX ADMIN — MORPHINE SULFATE PRN MG: 4 INJECTION, SOLUTION INTRAMUSCULAR; INTRAVENOUS at 09:09

## 2018-03-21 RX ADMIN — MORPHINE SULFATE PRN MG: 4 INJECTION, SOLUTION INTRAMUSCULAR; INTRAVENOUS at 12:47

## 2018-03-21 RX ADMIN — EXTENDED PHENYTOIN SODIUM SCH MG: 100 CAPSULE ORAL at 21:53

## 2018-03-21 RX ADMIN — MORPHINE SULFATE PRN MG: 4 INJECTION, SOLUTION INTRAMUSCULAR; INTRAVENOUS at 15:57

## 2018-03-21 RX ADMIN — TAMSULOSIN HYDROCHLORIDE SCH MG: 0.4 CAPSULE ORAL at 21:52

## 2018-03-21 RX ADMIN — GABAPENTIN SCH MG: 300 CAPSULE ORAL at 08:45

## 2018-03-21 RX ADMIN — SERTRALINE HYDROCHLORIDE SCH MG: 50 TABLET, FILM COATED ORAL at 21:53

## 2018-03-21 RX ADMIN — OXYCODONE HYDROCHLORIDE PRN MG: 5 TABLET ORAL at 00:55

## 2018-03-21 NOTE — SURGERY PROGRESS NOTE
DATE: 03/21/2018

 

He was seen this morning at approximately 7:00 on 03/21/2018.  Arsenio was

resting in bed, very hard to read this gentleman.  He has had significant

CVA.  He is complaining of some abdominal pain, mostly where he has a small

incision on the right upper quadrant and abdomen is softly distended.  His

last vital had showed him with a temperature of 37.3.  He has been

tachycardic overnight, last one being about 117, respirations 18, blood

pressure 122/83.  I&O, he has some difficulty urinating but then it seems

like he is voiding at 50 mL at a time.  He had a bladder scan this morning

that showed about 257 mL.  The abdomen remains a little softly distended, it

is hard to tell if he is tender any place else other than the right upper

quadrant.  At this point, we did obtain some laboratory studies.  His

hemoglobin was 12.1, WBC 6.4.  He is hyponatremic and hypokalemic.  We at

this point will change his IV fluids.  His blood sugars have been in the 160s

to 180s, we will try to titrate it more.  Increase his fluids to a point and

see if this is related to heart rate or just fluid depletion. 

Reevaluate him later today, and if at any time things deteriorate, we may

have the medical service see the patient.

 

 

 

 

JACK

## 2018-03-21 NOTE — OPERATIVE REPORT
DATE OF OPERATION:  03/20/2018

 

SURGEON:  Abhishek Nichole MD.

 

FIRST ASSISTANT:  Hieu Wei PA-C.

 

PREOPERATIVE DIAGNOSES:  Retroperitoneal mass suspicious for lymphoma, likely

need for chemotherapy, lesion in the left flank approximately 2 cm,

questionable metastatic lymphoma to the skin.

 

PROCEDURE:  Laparoscopy, open biopsy of retroperitoneal mass with frozen

sections, A-port placement in left internal jugular, excision of left flank

mass.

 

SUMMARY:  The patient was brought into the operating room theater, had voided

before he went to the operating room.  The abdomen was prepped with Betadine

solution and properly draped.  Systemic antibiotics were given.  At this

point, we made a small incision supraumbilically, sufficient enough to place

a Veress needle, followed by CO2, followed by 5 mm trocar.  Once we entered

the abdomen with the scope, we could see that the patient had a significant

amount of omentum draped all over the abdomen.  It was hard to visualize, did

not see anything suspicious on the liver or on the omentum.  At this point,

the area of interest was about 6 cm or so.  It was elevating the pancreatic

head, extending up toward the diaphragmatic surface.  We placed two 5 mm

trocars on both flanks.  Actually, we placed a secondary trocar in the right

upper quadrant, they were all 5 mm, with preemptive local analgesia of 1%

Xylocaine.  We tried to elevate the fatty tissue off the retroperitoneal area

and elevating the liver.  Anything we touched as far as the omentum was

concerned, it was draped over, bled quite easily.  Though we had no evidence

of any systemic hypertension, this may have been related to the patient had

been on Plavix, we had asked him to stop about 5 days ago, whether or not

there was some residual effect or whether or not he did not stop it

completely.  Having said this, it was hard to really evaluate and suspect

where the mass was from our viewing.  There seemed to be a lot of

inflammatory response around the subhepatic area, above the lesser curvature

of the stomach toward the diaphragmatic surface.  At this point, I elected

just to convert him to a small open incision in the right upper quadrant,

approximately 2 inches long, deepened through the subcutaneous tissue.  We

divided through the rectus, entered the peritoneal cavity.  At this point,

even with this maneuver, we could feel the mass protruding and elevating the

duodenum and I could feel going up above the duodenal sweep toward the

diaphragmatic area, a very hard mass.  We then freed up all the fatty tissue

on top of it so we were on top of the mass and there was a really fibrotic

shell to it.  We incised this and took multiple biopsies.  Once we were on

the mass itself, the tissue itself was very friable, it did not have the

typical fleshy appearance of a lymphoma, but we had some representative

section that we took down and I personally took down the pathologist to see

if we had enough tissue.  On quick prep, the pathologist felt that he had

enough lymphocytes but was not sure if this was reactive.  He went on and got

a frozen section on the area and pretty much confirmed the same thing,

indicating that he had enough tissue that most likely per flow cytometry to

rule out lymphoma.  The area was checked for hemostasis and appeared

satisfactory, did place a piece of Surgicel into the mass itself.  Once we

had accomplished this, we closed the wounds by using 905 chromic for the

posterior rectus peritoneum, interrupted #1 PDS for the anterior rectus,

subcutaneous tissue with 2-0 Dexon, staples applied to all the trocar sites. 

Having said this, we redraped the patient and placed a roll underneath the

shoulders.  Left chest cavity was prepped.  We attempted the subclavian vein

twice.  We did not get a good flow.  We were in the vein but were not able to

pass the guidewire easily.  Therefore, at this point, I went on between the 2

heads of sternocleidomastoid and accessed the internal jugular there without

any problem and passed the guidewire and fluoroscopically positioned it in

the superior vena cava right atrial area.  At this point, we then made a

counter incision below the clavicle, sufficient enough to create a pocket for

the reservoir that we checked it and it was easily palpable.  We enlarged the

incision where the guidewire had first gone in and at that point then we

brought in the catheter, we tunneled it with the metal tunneler and brought

it out through incision and to the cavity that we had created below it. 

Then placed the  peel away sheath, positioned the catheter

fluoroscopically and obviously was in good position as far as the area but it

seemed to be a little bit too low into the inferior vena cava.  We pulled it

back sufficiently, putting it at right atrial superior vena cava area.  The

skin marking on the catheter as we connected to the reservoir was about 25

cm.  We connected with the reservoir first, then placed a rubber bolster,

secured it in place.  After accomplishing this, we aspirated and flushed

quite easily.  We then placed the reservoir in the previously made pocket,

secured it in place with 2-0 Prolene and pocket was closed with 2-0 Dexon and

4-0 Monocryl, Steri-Strips applied.  Prior to leaving the room, we imaged the

whole system, aspirated and flushed percutaneously, it worked fine and the

catheter seemed in good position.  At this point, we turned to place him in

the right lateral position.  The area on the back was a skin lesion in the

left flank area, was about 2 cm.  We prepped the area again.  At this point,

we incised the area generously to the point that we took the lesion and what

appeared to be grossly free parameters.  We went to the subcutaneous tissue. 

The wound was then closed with 2-0 Dexon and staples for skin edges. 

Dressing was applied.  The procedure was tolerated well by the patient. 

Estimated blood loss approximately 20 mL.  The patient was taken to recovery

room in good condition.

 

 

I attest to the content of the Intraoperative Record and any orders documented 
therein. Any exceptions are noted below.

 

 

 

DENYSD

## 2018-03-21 NOTE — MEDICAL CONSULT
Consultation


Date of Consultation:


Mar 21, 2018.


Attending Physician:


Abhishek Nichole M.D.


Reason for Consultation:


Weakness and Tachycardia


History of Present Illness


Mr. Beckham is a 53 y/o male with PMHx of T2DM with Peripheral Neuropathy and 

CVA with L Residual Deficits who is S/P Open Retroperitoneal Mass Bx, 

Excisional Bx of Soft Tissue of L Lumbar Mass, and A Port in L Jugular. 

Hospitalist consulted for tachycardia and weakness. Patient has an extremely 

flat affect. Answers most questions but sometimes just shuts his eyes without 

answering. States he continues to have abdominal pain and that the pain 

medication only relieves it slightly. States he is expecting to have pain until 

that mass is completely removed. He does not think he is having any increased 

weakness but states he hasn't gotten out of bed yet today. LUE is completely 

flaccid and contractured at the fingers. Normally ambulates with a cane but 

does have chronic L sided deficits. Doesn't feel that these have changed. He is 

also hyponatremic on labs. Limited records but it appears that he is normally 

low normal. Is on Zoloft and given the possibility of a cancerous process this 

could be an SIADH picture which could produce further weakness. Doesn't 

directly explain the tachycardia unless this is from a hyponatremic picture. 

Patient does have dry oral mucosa. EKG reveals sinus tachycardia without 

ischemic changes or other worrisome findings. He was bladder scanned for 500 cc 

and will have Jay placed. Is on Flomax daily. Will obtain KUB to assess for 

heavy stool burden that could affect urinary retention or possible stone (

unlikely). Also could assess for possible ileus that could be producing further 

pain. Abdomen does not appear acute. Tachycardia may be pain response vs some 

fluid deficits.





Past Medical/Surgical History


1. T2DM with Peripheral Neuropathy


2. CVA with L Residual Deficits





Family History





Family history was reviewed; no changes noted.





Social History


Smoking Status:  Former Smoker


Smokeless Tobacco Use:  No


Alcohol Use:  none


Marital Status:  single


Housing Status:  lives with family





Allergies


Coded Allergies:  


     No Known Allergies (Unverified , 3/20/18)





Current Inpatient Medications





Current Inpatient Medications








 Medications


  (Trade)  Dose


 Ordered  Sig/Dominic


 Route  Start Time


 Stop Time Status Last Admin


Dose Admin


 


 Ondansetron HCl


  (Zofran Inj)  4 mg  Q4H  PRN


 IV  3/20/18 13:45


 4/19/18 13:44   


 


 


 Morphine Sulfate


  (MoRPHine


 SULFATE INJ)  4 mg  Q1H  PRN


 IV  3/20/18 13:45


 4/3/18 13:44  3/21/18 15:57


4 MG


 


 Insulin Aspart


  (novoLOG ASPART)  **SLIDING


 SCALE**


 **If C...  ACHS


 SC  3/20/18 16:00


 4/19/18 15:59  3/21/18 12:56


3 UNITS


 


 Glucose


  (Glucose 40% Gel)  15-30


 GRAMS 15


 GRAMS...  UD  PRN


 PO  3/20/18 13:45


 4/19/18 13:44   


 


 


 Glucose


  (Glucose Chew


 Tab)  4-8


 Tablets 4


 Tabl...  UD  PRN


 PO  3/20/18 13:45


 4/19/18 13:44   


 


 


 Dextrose


  (Dextrose 50%


 50ML Syringe)  25-50ML OF


 50% DW IV


 FOR...  UD  PRN


 IV  3/20/18 13:45


 4/19/18 13:44   


 


 


 Glucagon


  (Glucagon Inj)  1 mg  UD  PRN


 SQ  3/20/18 13:45


 4/19/18 13:44   


 


 


 Aspirin


  (Ecotrin Tab)  81 mg  QAM


 PO  3/21/18 09:00


 4/20/18 08:59  3/21/18 08:45


81 MG


 


 Gabapentin


  (Neurontin Cap)  300 mg  TID


 PO  3/20/18 21:00


 4/19/18 20:59  3/21/18 13:35


300 MG


 


 Oxycodone HCl


  (Roxicodone


 Immediate Rel Tab)  10 mg  Q4H  PRN


 PO  3/20/18 13:45


 4/3/18 13:44  3/21/18 05:48


10 MG


 


 Pantoprazole


 Sodium


  (Protonix Tab)  40 mg  QAM


 PO  3/21/18 09:00


 4/20/18 08:59  3/21/18 08:45


40 MG


 


 Phenytoin Sodium


  (Dilantin Er Cap)  300 mg  QPM


 PO  3/20/18 21:00


 4/19/18 20:59  3/20/18 20:55


300 MG


 


 Sertraline HCl


  (Zoloft Tab)  50 mg  HS


 PO  3/20/18 21:00


 4/19/18 20:59  3/20/18 20:54


50 MG


 


 Tamsulosin HCl


  (Flomax Cap)  0.4 mg  HS


 PO  3/20/18 21:00


 4/19/18 20:59  3/20/18 20:54


0.4 MG


 


 Timolol Maleate


  (Timoptic 0.25%


 Oph Soln)  1 drops  QAM


 OPR  3/21/18 09:00


 4/20/18 08:59  3/21/18 08:46


1 DROPS


 


 Miscellaneous


  (Iv Fluids


 Completed)  1 ea  PRN  PRN


 N/A  3/20/18 15:15


 3/20/19 15:14   


 


 


 Potassium


 Chloride/Sodium


 Chloride  1,000 ml @ 


 125 mls/hr  Q8H


 IV  3/21/18 10:15


 4/20/18 10:14  3/21/18 10:37


125 MLS/HR


 


 Oxycodone/


 Acetaminophen


  (Percocet


 5-325mg Tab)  1 tab  Q4H  PRN


 PO  3/21/18 11:00


 4/4/18 10:59   


 


 


 Lidocaine HCl


  (Xylocaine Jelly


 2%)  1 APPLICATION


 PER URETHRA


 FOR URIN...  PRN  PRN


 EXT  3/21/18 15:30


 4/20/18 15:29  3/21/18 15:57


1 ML











Review of Systems


Constitutional:  No fever, No chills, No weakness, No fatigue


Eyes:  No worsening of vision


ENT:  No nasal symptoms, No sore throat, No trouble swallowing


Respiratory:  No cough, No shortness of breath


Cardiovascular:  No chest pain, No palpitations


Abdomen:  + pain, No nausea, No vomiting, No diarrhea, No constipation, No GI 

bleeding


Musculoskeletal:  No joint pain, No swelling, No calf pain


Genitourinary - Male:  No dysuria


Neurologic:  + weakness (L sided residual deficits from CVA)


Hematologic / Lymphatic:  No abnormal bleeding/bruising


Integumentary:  No rash





Physical Exam











  Date Time  Temp Pulse Resp B/P (MAP) Pulse Ox O2 Delivery O2 Flow Rate FiO2


 


3/21/18 15:09 37.5 118 18 137/83 (101) 94 Room Air  


 


3/21/18 13:06 37.3 117 18 127/86 (100) 91 Room Air  


 


3/21/18 09:53 37.3 114 18 129/86 (100) 91 Room Air  


 


3/21/18 08:15      Room Air  


 


3/21/18 03:25 37.3 117 18 122/83 (96) 91 Room Air  


 


3/21/18 00:00      Room Air  


 


3/20/18 22:54 37.3 115 16 115/79 (91) 93 Room Air  


 


3/20/18 17:29 36.8 105 20 132/85 (101) 98 Nasal Cannula 2.0 


 


3/20/18 16:44  102 20 122/83 (96) 100 Nasal Cannula 2.0 








General Appearance:  no apparent distress, + pertinent finding (flat affect/

minimal eye contact)


Head:  normocephalic, atraumatic


ENT:  hearing grossly normal


Neck:  supple, no JVD, trachea midline


Respiratory/Chest:  lungs clear, normal breath sounds, no respiratory distress, 

no accessory muscle use, + pertinent finding (L upper chest port placement with 

dressing applied C/D/I)


Cardiovascular:  no gallop, no murmur, + tachycardia (regular rhythm)


Abdomen/GI:  normal bowel sounds, + distended


Extremities/Musculoskelatal:  no calf tenderness, no pedal edema, + pertinent 

finding (flaccid LUE with contractured fingers)


Neurologic/Psych:  alert, oriented x 3


Skin:  normal color, warm/dry





Laboratory Results





Last 24 Hours








Test


  3/20/18


17:27 3/20/18


20:43 3/21/18


08:03 3/21/18


08:17


 


Bedside Glucose 125 mg/dl  199 mg/dl   169 mg/dl 


 


White Blood Count   6.40 K/uL  


 


Red Blood Count   4.52 M/uL  


 


Hemoglobin   12.1 g/dL  


 


Hematocrit   37.0 %  


 


Mean Corpuscular Volume   81.9 fL  


 


Mean Corpuscular Hemoglobin   26.8 pg  


 


Mean Corpuscular Hemoglobin


Concent 


  


  32.7 g/dl 


  


 


 


Platelet Count   265 K/uL  


 


Mean Platelet Volume   9.0 fL  


 


Neutrophils (%) (Auto)   83.4 %  


 


Lymphocytes (%) (Auto)   3.6 %  


 


Monocytes (%) (Auto)   12.5 %  


 


Eosinophils (%) (Auto)   0.0 %  


 


Basophils (%) (Auto)   0.2 %  


 


Neutrophils # (Auto)   5.34 K/uL  


 


Lymphocytes # (Auto)   0.23 K/uL  


 


Monocytes # (Auto)   0.80 K/uL  


 


Eosinophils # (Auto)   0.00 K/uL  


 


Basophils # (Auto)   0.01 K/uL  


 


RDW Standard Deviation   44.1 fL  


 


RDW Coefficient of Variation   14.7 %  


 


Immature Granulocyte % (Auto)   0.3 %  


 


Immature Granulocyte # (Auto)   0.02 K/uL  


 


Sodium Level   128 mmol/L  


 


Potassium Level   4.1 mmol/L  


 


Chloride Level   94 mmol/L  


 


Carbon Dioxide Level   26 mmol/L  


 


Anion Gap   8.0 mmol/L  


 


Blood Urea Nitrogen   5 mg/dl  


 


Creatinine   0.34 mg/dl  


 


Est Creatinine Clear Calc


Drug Dose 


  


  248.6 ml/min 


  


 


 


Estimated GFR (


American) 


  


  > 150.0 


  


 


 


Estimated GFR (Non-


American 


  


  147.0 


  


 


 


BUN/Creatinine Ratio   15.7  


 


Random Glucose   169 mg/dl  


 


Calcium Level   8.4 mg/dl  


 


Test


  3/21/18


16:20 3/21/18


16:30 


  


 











Assessment & Plan


Mr. Beckham is a 53 y/o male with PMHx of T2DM with Peripheral Neuropathy and 

CVA with L Residual Deficits who is S/P Open Retroperitoneal Mass Bx, 

Excisional Bx of Soft Tissue of L Lumbar Mass, and A Port in L Jugular. 

Hospitalist consulted for tachycardia and weakness.





Sinus Tachycardia: Pain Response vs Hypovolemia


- EKG reviewed that shows sinus tachy without ischemic changes or other 

worrisome changes


- Reporting continued abdominal pain even with pain medication - possibly 

worsened by urinary retention


- Labs as below for hyponatremia; will add Dilantin level to assess for toxicity





Hyponatremia: SIADH vs Dehydration:


- Patient does have dry oral mucosa and some urinary retention which could 

support dehydration but patient is on Zoloft and with the possibility of 

cancerous process this could be an SIADH picture


- Serum osm, urine osm, random urine sodium, random urine potassium, random 

cortisol, TSH


- Will reassess laboratories





Urinary Retention with BPH:


- Will obtain KUB to assess for heavy stool burden to explain continued pain 

and possibly retention - could add Senna if evidence of constipation given pain 

medication


- Place Jay and obtain UA; urine is dark kris colored but does not appear to 

have heavy sediment


- Continue Flomax 0.4 mg HS





T2DM with Peripheral Neuropathy:


- Cover with SSI - will broaden goal to 140-180 with correction of 35


- Gabapentin 300 mg TID





CVA with L Residual Deficits: STABLE


- ASA 81 mg daily; Will defer Plavix 75 mg daily to primary team when they 

would like to continued


- Dilantin 300 mg daily and Zoloft 50 mg HS





Disposition:


- Will await further testing to give further recommendations; would encourage 

ambulation when possible





Hospitalists will continue to follow





===================================================================





I personally interviewed and examined the patient.


I agree with history of present illness and physical exam mentioned above, I 

also performed my own history taking and examination.


Past medical history and review of system has been obtained by myself


I reviewed all pertinent labs and studies


Reviewed current medications


I discussed and formulated of the assessment and plan mentioned above.


Please refer to the Summary mentioned below.





52-year-old man with past medical history of diabetes mellitus type 2, 

peripheral neuropathy and CVA with left-sided weakness.  Patient unfortunately 

developed very suspicious significant lymphadenopathy and left lumbar mass 

suspicious for malignancy.  Presented today for an elective biopsies.  Status 

post procedure he developed abdominal pain/tenderness/tachycardia.  Patient 

will be placed under observation to rule out any complication from the 

procedure or any other concomitant sickness.


Abdominal imaging was reviewed.  Will review serial lactic acid levels.  Will 

order labs for a.m.


Thank you for the opportunity of sharing in the care of Mr. Beckham





General Appearance: in mild distress


Eyes: normal Sclerae,  extraocular muscle intact


ENT:  hearing grossly normal


Neck:  supple


Respiratory/Chest: normal air entry  bilateral ,no respiratory distress, no 

accessory muscle use


Cardiovascular:  regular rate, rhythm, no  murmur


Abdomen:   distended and  tender, masses


Extremities:  no edema


Neurologic/Psychiatric: Awake alert oriented times place and person moves all 

extremities sensation intact cranial nerves II-12 appear to be intact


Skin:  normal color, warm/dry, no rash





Roshni Cole MD, 


[facility] hospitalist group

## 2018-03-21 NOTE — SURGERY PROGRESS NOTE
Surgery Progress Note


Date of Service


Mar 21, 2018.





Subjective


Post OP Day:  1


+ complaints (abdominal pain), + diet (tolerating), No nausea


weakness/fatigue that began yesterday walking into the hospital, family had to 

bring in in wheelchair





Objective


Vital Signs:











  Date Time  Temp Pulse Resp B/P (MAP) Pulse Ox O2 Delivery O2 Flow Rate FiO2


 


3/21/18 09:53 37.3 114 18 129/86 (100) 91 Room Air  


 


3/21/18 08:15      Room Air  


 


3/21/18 03:25 37.3 117 18 122/83 (96) 91 Room Air  


 


3/21/18 00:00      Room Air  


 


3/20/18 22:54 37.3 115 16 115/79 (91) 93 Room Air  


 


3/20/18 17:29 36.8 105 20 132/85 (101) 98 Nasal Cannula 2.0 


 


3/20/18 16:44  102 20 122/83 (96) 100 Nasal Cannula 2.0 


 


3/20/18 15:50 36.9 91 20 119/83 (95) 98 Nasal Cannula 2.0 


 


3/20/18 15:50     98 Nasal Cannula 2.0 


 


3/20/18 15:13 36.6 50 18 114/79 (91) 96 Nasal Cannula 2.0 


 


3/20/18 14:40      Nasal Cannula 2.0 


 


3/20/18 14:40 36.6 100 18 114/78 (90) 95 Nasal Cannula 2.0 


 


3/20/18 14:40      Nasal Cannula 2.0 


 


3/20/18 14:20 37.1 99 20 118/84 97 Nasal Cannula 2 


 


3/20/18 14:10  95 12 123/84 97 Nasal Cannula 2 


 


3/20/18 14:00  94 16 128/81 98 Nasal Cannula 2 


 


3/20/18 13:50  89 17 121/87 100 Oxymask 10 


 


3/20/18 13:40  93 14 118/81 100 Oxymask 10 


 


3/20/18 13:34 37.2 91 15 126/84 100 Oxymask 10 








Abdomen:  non distended, soft


Incision(s):  clean, dry


Laboratory Results:





Results Past 24 Hours








Test


  3/20/18


13:39 3/20/18


17:27 3/20/18


20:43 3/21/18


08:03 Range/Units


 


 


Bedside Glucose 162 125 199  70-99  mg/dl


 


White Blood Count    6.40 4.8-10.8  K/uL


 


Red Blood Count    4.52 4.7-6.1  M/uL


 


Hemoglobin    12.1 14.0-18.0  g/dL


 


Hematocrit    37.0 42-52  %


 


Mean Corpuscular Volume    81.9   fL


 


Mean Corpuscular Hemoglobin    26.8 25-34  pg


 


Mean Corpuscular Hemoglobin


Concent 


  


  


  32.7


  32-36  g/dl


 


 


Platelet Count    265 130-400  K/uL


 


Mean Platelet Volume    9.0 7.4-10.4  fL


 


Neutrophils (%) (Auto)    83.4  %


 


Lymphocytes (%) (Auto)    3.6  %


 


Monocytes (%) (Auto)    12.5  %


 


Eosinophils (%) (Auto)    0.0  %


 


Basophils (%) (Auto)    0.2  %


 


Neutrophils # (Auto)    5.34 1.4-6.5  K/uL


 


Lymphocytes # (Auto)    0.23 1.2-3.4  K/uL


 


Monocytes # (Auto)    0.80 0.11-0.59  K/uL


 


Eosinophils # (Auto)    0.00 0-0.5  K/uL


 


Basophils # (Auto)    0.01 0-0.2  K/uL


 


RDW Standard Deviation    44.1 36.4-46.3  fL


 


RDW Coefficient of Variation    14.7 11.5-14.5  %


 


Immature Granulocyte % (Auto)    0.3  %


 


Immature Granulocyte # (Auto)    0.02 0.00-0.02  K/uL


 


Sodium Level    128 136-145  mmol/L


 


Potassium Level    4.1 3.5-5.1  mmol/L


 


Chloride Level    94   mmol/L


 


Carbon Dioxide Level    26 21-32  mmol/L


 


Anion Gap    8.0 3-11  mmol/L


 


Blood Urea Nitrogen    5 7-18  mg/dl


 


Creatinine


  


  


  


  0.34


  0.60-1.40


mg/dl


 


Est Creatinine Clear Calc


Drug Dose 


  


  


  248.6


   ml/min


 


 


Estimated GFR (


American) 


  


  


  > 150.0


   


 


 


Estimated GFR (Non-


American 


  


  


  147.0


   


 


 


BUN/Creatinine Ratio    15.7 10-20  


 


Random Glucose    169 70-99  mg/dl


 


Calcium Level    8.4 8.5-10.1  mg/dl


 


Test


  3/21/18


08:17 


  


  


  Range/Units


 


 


Bedside Glucose 169    70-99  mg/dl











Assessment & Plan


s/p abdominal lymph node biopsy, A-port, skin lesion excision


   will have therapy see


   tachycardia overnight, will continue to monitor


   plan to keep here today

## 2018-03-21 NOTE — ANESTHESIOLOGY PROGRESS NOTE
Anesthesia Post Op Note


Date & Time


Mar 21, 2018 at 11:06





Vital Signs


Pain Intensity:  9.0





Vital Signs Past 12 Hours








  Date Time  Temp Pulse Resp B/P (MAP) Pulse Ox O2 Delivery O2 Flow Rate FiO2


 


3/21/18 09:53 37.3 114 18 129/86 (100) 91 Room Air  


 


3/21/18 08:15      Room Air  


 


3/21/18 03:25 37.3 117 18 122/83 (96) 91 Room Air  


 


3/21/18 00:00      Room Air  











Notes


Mental Status:  alert / awake / arousable, participated in evaluation


Pt Amnestic to Procedure:  Yes


Nausea / Vomiting:  adequately controlled


Pain:  improving with treatment, see Notes


Airway Patency, RR, SpO2:  stable & adequate


BP & HR:  stable & adequate


Hydration State:  stable & adequate


Anesthetic Complications:  no major complications apparent


patient's pain r/t cancer and not surgery. His oncologist is controlling the 

pain/ meds.

## 2018-03-21 NOTE — SURGERY PROGRESS NOTE
DATE: 03/21/2018

 

Arsenio since I last saw him this morning appears a bit more comfortable.  He

is lying in bed.  He is not hungry, but his only abdominal discomfort is in

the subcostal area where we made a small incision.  His last vitals showed a

temperature of 37.5, heart rate is 118, respiratory rate 18, blood pressure

137/83, O2 sats 94 on room air.  At the present time, the physician assistant

for the internal medicine service is seeing him.  Labs to be repeated are

pending at this time.  Workup for his tachycardia is in process.

## 2018-03-21 NOTE — DIAGNOSTIC IMAGING REPORT
KUB



HISTORY: Acute abdominal distention  Abdominal Distention/Pain



COMPARISON: PET CT 3/19/2018



FINDINGS: There is moderate gaseous distention of the large bowel with moderate

stool volume within the right hemicolon. There is relative paucity of small

bowel gas without evidence of small bowel obstruction. Skin staples are noted

overlying the abdominal right upper quadrant. Linear lucency adjacent to the

lesser curvature of the stomach is noted. No pneumatosis. No urolith. Fluid with

of the pelvis. No fracture. Port catheter projects over the right atrium.



IMPRESSION:  



1. Surgical skin staples of the right upper abdomen with linear lucency adjacent

to the lesser curvature of the stomach, possibly reflecting pneumoperitoneum in

the setting of recent surgery.

2. Mild colonic distention suggests colonic ileus without evidence of small

bowel obstruction.







Electronically signed by:  Patrick Shen M.D.

3/21/2018 7:13 PM



Dictated Date/Time:  3/21/2018 7:09 PM

## 2018-03-21 NOTE — DISCHARGE INSTRUCTIONS
Discharge Instructions


Date of Service


Mar 21, 2018.





Admission


Reason for Admission:  Retroperitoneal Mass, Neoplasm Of Uncertain





Discharge


Discharge Diagnosis / Problem:  biopsy of mass, A-port, excision of skin lesion





Discharge Goals


Goal(s):  Diagnostic testing





Activity Recommendations


Activity Limitations:  resume your previous activity


Shower/Bathe:  no limitations





.





Instructions / Follow-Up


Instructions / Follow-Up


Dr. Nichole in 1 week, call [phone] to schedule if you do not already have 

an appt or have any questions


Restart Plavix on Thursday





Current Hospital Diet


Patient's current hospital diet: Diabetes Type 2 Diet





Discharge Diet


Recommended Diet:  Regular Diet





Procedures


Procedures Performed:  


Laparoscopic attempted Biopsy of Retroperitoneal Mass convert to Open; A-Port 


Placement Left Jugular; Excisional Biopsy of 2cm soft tissue mass Lef tLumbar 


Mass





Pending Studies


Studies pending at discharge:  yes


List of pending studies:  


pathology





Laboratory Results





Hemoglobin A1c








Test


  2/21/18


14:00 Range/Units


 


 


Estimated Average Glucose 143   mg/dl


 


Hemoglobin A1c 6.6 H 4.5-5.6  %








Lipid Panel








Test


  1/10/18


08:30 Range/Units


 


 


Triglycerides Level 168 H 0-150  mg/dl


 


Cholesterol Level 202 H 0-200  mg/dl


 


HDL Cholesterol 40   mg/dl


 


LDL Cholesterol Direct 146   mg/dl


 


Cholesterol/HDL Ratio 5.1   


 


LDL Cholesterol, Calculated    mg/dl











Medical Emergencies








.


Who to Call and When:





Medical Emergencies:  If at any time you feel your situation is an emergency, 

please call 911 immediately.





.





Non-Emergent Contact


Non-Emergency issues call your:  Surgeon


Call Non-Emergent contact if:  you have a fever, temperature is above 101.5, 

your pain is not controlled, you have any medication questions


.








"Provider Documentation" section prepared by Hieu Wei.








.

## 2018-03-22 VITALS
SYSTOLIC BLOOD PRESSURE: 134 MMHG | OXYGEN SATURATION: 91 % | TEMPERATURE: 99.32 F | HEART RATE: 116 BPM | DIASTOLIC BLOOD PRESSURE: 86 MMHG

## 2018-03-22 VITALS — HEART RATE: 115 BPM | SYSTOLIC BLOOD PRESSURE: 132 MMHG | DIASTOLIC BLOOD PRESSURE: 85 MMHG

## 2018-03-22 VITALS
OXYGEN SATURATION: 96 % | DIASTOLIC BLOOD PRESSURE: 85 MMHG | TEMPERATURE: 100.4 F | SYSTOLIC BLOOD PRESSURE: 130 MMHG | HEART RATE: 114 BPM

## 2018-03-22 VITALS
SYSTOLIC BLOOD PRESSURE: 136 MMHG | HEART RATE: 116 BPM | DIASTOLIC BLOOD PRESSURE: 93 MMHG | TEMPERATURE: 99.32 F | OXYGEN SATURATION: 94 %

## 2018-03-22 VITALS — TEMPERATURE: 98.78 F

## 2018-03-22 LAB
ALBUMIN SERPL-MCNC: 1.6 GM/DL (ref 3.4–5)
ALP SERPL-CCNC: 292 U/L (ref 45–117)
ALT SERPL-CCNC: 239 U/L (ref 12–78)
AST SERPL-CCNC: 280 U/L (ref 15–37)
BASOPHILS # BLD: 0 K/UL (ref 0–0.2)
BASOPHILS NFR BLD: 0 %
BUN SERPL-MCNC: 5 MG/DL (ref 7–18)
CALCIUM SERPL-MCNC: 7.7 MG/DL (ref 8.5–10.1)
CO2 SERPL-SCNC: 24 MMOL/L (ref 21–32)
CREAT SERPL-MCNC: 0.34 MG/DL (ref 0.6–1.4)
EOS ABS #: 0.01 K/UL (ref 0–0.5)
EOSINOPHIL NFR BLD AUTO: 234 K/UL (ref 130–400)
GLUCOSE SERPL-MCNC: 173 MG/DL (ref 70–99)
HCT VFR BLD CALC: 33.5 % (ref 42–52)
HGB BLD-MCNC: 11 G/DL (ref 14–18)
IG#: 0.02 K/UL (ref 0–0.02)
IMM GRANULOCYTES NFR BLD AUTO: 3.6 %
INR PPP: 1.9 (ref 0.9–1.1)
LYMPHOCYTES # BLD: 0.22 K/UL (ref 1.2–3.4)
MCH RBC QN AUTO: 26.9 PG (ref 25–34)
MCHC RBC AUTO-ENTMCNC: 32.8 G/DL (ref 32–36)
MCV RBC AUTO: 81.9 FL (ref 80–100)
MONO ABS #: 0.6 K/UL (ref 0.11–0.59)
MONOCYTES NFR BLD: 9.9 %
NEUT ABS #: 5.22 K/UL (ref 1.4–6.5)
NEUTROPHILS # BLD AUTO: 0.2 %
NEUTROPHILS NFR BLD AUTO: 86 %
PMV BLD AUTO: 8.8 FL (ref 7.4–10.4)
POTASSIUM SERPL-SCNC: 4.1 MMOL/L (ref 3.5–5.1)
PROT SERPL-MCNC: 5.3 GM/DL (ref 6.4–8.2)
RED CELL DISTRIBUTION WIDTH CV: 14.8 % (ref 11.5–14.5)
RED CELL DISTRIBUTION WIDTH SD: 44.5 FL (ref 36.4–46.3)
SODIUM SERPL-SCNC: 127 MMOL/L (ref 136–145)
WBC # BLD AUTO: 6.07 K/UL (ref 4.8–10.8)

## 2018-03-22 RX ADMIN — INSULIN ASPART SCH UNITS: 100 INJECTION, SOLUTION INTRAVENOUS; SUBCUTANEOUS at 21:36

## 2018-03-22 RX ADMIN — PANTOPRAZOLE SCH MG: 40 TABLET, DELAYED RELEASE ORAL at 08:47

## 2018-03-22 RX ADMIN — INSULIN ASPART SCH UNITS: 100 INJECTION, SOLUTION INTRAVENOUS; SUBCUTANEOUS at 12:44

## 2018-03-22 RX ADMIN — MORPHINE SULFATE PRN MG: 4 INJECTION, SOLUTION INTRAMUSCULAR; INTRAVENOUS at 11:02

## 2018-03-22 RX ADMIN — POLYETHYLENE GLYCOL 3350 PRN GM: 17 POWDER, FOR SOLUTION ORAL at 21:36

## 2018-03-22 RX ADMIN — SODIUM CHLORIDE SCH MLS/HR: 900 INJECTION, SOLUTION INTRAVENOUS at 19:00

## 2018-03-22 RX ADMIN — TIMOLOL MALEATE SCH DROPS: 5 SOLUTION OPHTHALMIC at 08:48

## 2018-03-22 RX ADMIN — STANDARDIZED SENNA CONCENTRATE SCH MG: 8.6 TABLET ORAL at 08:48

## 2018-03-22 RX ADMIN — SODIUM CHLORIDE AND POTASSIUM CHLORIDE SCH MLS/HR: 9; 1.49 INJECTION, SOLUTION INTRAVENOUS at 02:24

## 2018-03-22 RX ADMIN — GABAPENTIN SCH MG: 300 CAPSULE ORAL at 21:24

## 2018-03-22 RX ADMIN — TAMSULOSIN HYDROCHLORIDE SCH MG: 0.4 CAPSULE ORAL at 21:24

## 2018-03-22 RX ADMIN — EXTENDED PHENYTOIN SODIUM SCH MG: 100 CAPSULE ORAL at 21:24

## 2018-03-22 RX ADMIN — INSULIN ASPART SCH UNITS: 100 INJECTION, SOLUTION INTRAVENOUS; SUBCUTANEOUS at 08:47

## 2018-03-22 RX ADMIN — OXYCODONE HYDROCHLORIDE PRN MG: 5 TABLET ORAL at 19:26

## 2018-03-22 RX ADMIN — OXYCODONE HYDROCHLORIDE PRN MG: 5 TABLET ORAL at 07:10

## 2018-03-22 RX ADMIN — HEPARIN SODIUM SCH UNIT: 10000 INJECTION, SOLUTION INTRAVENOUS; SUBCUTANEOUS at 21:35

## 2018-03-22 RX ADMIN — INSULIN ASPART SCH UNITS: 100 INJECTION, SOLUTION INTRAVENOUS; SUBCUTANEOUS at 19:00

## 2018-03-22 RX ADMIN — MORPHINE SULFATE PRN MG: 4 INJECTION, SOLUTION INTRAMUSCULAR; INTRAVENOUS at 19:03

## 2018-03-22 RX ADMIN — GABAPENTIN SCH MG: 300 CAPSULE ORAL at 08:47

## 2018-03-22 RX ADMIN — GABAPENTIN SCH MG: 300 CAPSULE ORAL at 15:58

## 2018-03-22 RX ADMIN — MORPHINE SULFATE PRN MG: 4 INJECTION, SOLUTION INTRAMUSCULAR; INTRAVENOUS at 16:07

## 2018-03-22 RX ADMIN — Medication SCH MG: at 08:48

## 2018-03-22 RX ADMIN — MORPHINE SULFATE PRN MG: 4 INJECTION, SOLUTION INTRAMUSCULAR; INTRAVENOUS at 06:21

## 2018-03-22 RX ADMIN — SERTRALINE HYDROCHLORIDE SCH MG: 50 TABLET, FILM COATED ORAL at 21:24

## 2018-03-22 RX ADMIN — HEPARIN SODIUM SCH UNIT: 10000 INJECTION, SOLUTION INTRAVENOUS; SUBCUTANEOUS at 12:59

## 2018-03-22 RX ADMIN — SODIUM CHLORIDE SCH MLS/HR: 900 INJECTION, SOLUTION INTRAVENOUS at 09:45

## 2018-03-22 NOTE — SURGERY PROGRESS NOTE
DATE: 03/22/2018

 

Arsenio is resting comfortably, although he states he is still not feeling

good.  His last vitals showed a temperature of 37.1.  His pulse has been as

high as 130-140s.  Medical service has seen him and trying to decide for the

cause of it.  His abdomen is still distended but it is softer than yesterday.

 He has no localized tenderness.  The A-port site looks fine.  Laboratory

wise this morning are pending.  A Jay catheter had been inserted.  He had

400 mL of urine yesterday.  The contrast of the urine is dark and it appears

to be concentrated.  Also of note is his liver enzymes yesterday were

elevated and it has been slightly elevated also few months ago.  His albumin

was 1.9.  At this point, we will start him on clear liquids and advance his

diet as tolerated.  The path report is still pending.

## 2018-03-22 NOTE — DIAGNOSTIC IMAGING REPORT
(CHEST FOR PE) ANGIO WITH



CT DOSE: 290.31 mGy.cm



HISTORY:  52 years-old Male presents with acute shortness of breath and

tachycardia



TECHNIQUE: Multiple CTA images of the chest were obtained after the intravenous

administration of 102 ml Optiray 320.  Coronal and sagittal MIPS were obtained

from the axial data set and were submitted for review.  A dose lowering

technique was utilized adhering to the principles of ALARA.



COMPARISON: Chest radiograph 3/20/2018, PET CT 3/19/2018, CT chest 2/27/2018.



FINDINGS: 



CTA:  

Mild multichamber cardiac enlargement. Coronary arterial disease. Thoracic aorta

is normal in course and caliber without aneurysm or dissection. Mild mixed

plaquing of the thoracic aorta and proximal great vessels appear patent. The

pulmonary arterial tree demonstrates minimal air within the main segment, likely

secondary to IV catheter. Study is moderately motion degraded which limits

evaluation of the distal lobar, segmental and subsegmental branches of the

pulmonary arterial tree. No definite filling defects identified to suggest

pulmonary thromboembolic disease.

 

CT CHEST:  

No dominant thyroid nodule. Left supraclavicular lymph node measures up to 1.4 x

2.8 cm. Pathologic mediastinal, distal periesophageal and upper abdominal

adenopathy redemonstrated which has not significantly changed from comparison

PET CT 3/19/2018. Subcarinal lymph nodes measure up to 1.4 cm in short axis.

There is esophageal lymph nodes measure up to 1.1 cm in short axis. Retrocrural

lymph nodes on the right measure up to 1.3 cm in short axis.



No large pleural effusion or pneumothorax. Subsegmental dependent consolidative

opacities suggest atelectasis. There is mild bilateral bronchial wall

thickening. Indeterminate pleural-based 4 mm nodule of the apical posterior

segment left upper lobe. Central airways appear patent.



Mild pneumoperitoneum noted adjacent to the liver. Minimal deep tissue air is

seen. As I 4 process, both of these findings are likely postsurgical. Deep

tissue air is noted about a left pectoral [id number] catheter with distal tip

in the SVC. The bones appear intact without suspicious lytic or blastic bony

lesions identified.





IMPRESSION:  

1. Motion degraded exam with limited evaluation of the pulmonary arterial tree

as above. No central embolus or acute aortic pathology identified.

2. Dependent subsegmental bibasilar consolidation suggests atelectasis.

3. Pathologically enlarged left supraclavicular, mediastinal, retrocrural,

periesophageal and upper abdominal adenopathy redemonstrated, further

characterized on comparison PET CT 3/19/2018 suspicious for lymphoproliferative

disorder.

4. Minimal pneumoperitoneum is likely postsurgical. Postsurgical deep tissue air

is also noted about the left pectoral Cpebif-u-Wbsg catheter.

5. Mild cardiomegaly.







The above report was generated using voice recognition software. It may contain

grammatical, syntax or spelling errors.







Electronically signed by:  Patrick Shen M.D.

3/22/2018 3:39 PM



Dictated Date/Time:  3/22/2018 3:29 PM

## 2018-03-22 NOTE — HOSPITALIST PROGRESS NOTE
Hospitalist Progress Note


Date of Service


Mar 22, 2018.


 (Sarah cMclendon .JOSE EDUARDO)





Subjective


Pt evaluation today including:  conversation w/ patient, physical exam, chart 

review, lab review, review of studies, review of inpatient medication list


Mr. Beckham denies discomfort other than soreness at his incision sites. He 

speaks with a very flat affect but appears comfortable. His vital signs today 

show persistent tachycardia and he briefly had an elevated temperature of 37.7





ROS


Constitutional: no chills, aches, sweats or fever


Respiratory: no sob,cough, sputum, or wheezing


Cardiac: no chest pain, palpitations, edema, orthopnea or lightheadedness


GI: no abdominal pain, nausea, vomiting, diarrhea or constipation


: no dysuria or hesitancy


Extremities: no joint pain or weakness


Skin: no rash





All other systems reviewed and negative


 (Sarah Mcclendon .JOSE EDUARDO)





Medications





Medications Administered








 Medications


  (Trade)  Dose


 Ordered  Sig/Dominic


 Route  Start Time


 Stop Time Status Last Admin


Dose Admin


 


 Lactated Ringer's  1,000 ml @ 


 15 mls/hr  Q24H


 IV  3/20/18 06:00


 3/20/18 14:09 DC 3/20/18 08:38


15 MLS/HR


 


 Fentanyl Citrate


  (Fentanyl Inj)  50 mcg  Q5M  PRN


 IV  3/20/18 09:00


 3/20/18 16:00 DC 3/20/18 14:08


50 MCG


 


 Lidocaine HCl


  (Xylocaine 1%


 Inj (Local))  40 ml  STK-MED ONCE


 .ROUTE  3/20/18 09:57


 3/20/18 09:58 DC 3/20/18 12:59


20 ML


 


 Heparin Sodium


  (Porcine)


  (Heparin 100


 Unit/ml 5ml Flush)  25 ml  STK-MED ONCE


 .ROUTE  3/20/18 09:57


 3/20/18 09:58 DC 3/20/18 12:58


5 ML


 


 Bacitracin


  (Bacitracin Inj)  50,000 units  STK-MED ONCE


 .ROUTE  3/20/18 09:57


 3/20/18 09:58 DC 3/20/18 11:07


50,000 UNITS


 


 Miscellaneous


  (Surgicel Absorb


 Hemostat 2in X


 14in)  1 ea  ONE  ONCE


 TOP  3/20/18 11:42


 3/20/18 11:47 DC 3/20/18 11:47


1 EA


 


 Lactated Ringer's  1,000 ml @ 


 125 mls/hr  Q8H


 IV  3/20/18 15:15


 3/21/18 09:47 DC 3/21/18 05:50


75 MLS/HR


 


 Morphine Sulfate


  (MoRPHine


 SULFATE INJ)  4 mg  Q1H  PRN


 IV  3/20/18 13:45


 4/3/18 13:44  3/22/18 16:07


4 MG


 


 Insulin Aspart


  (novoLOG ASPART)  **SLIDING


 SCALE**


 **If C...  ACHS


 SC  3/20/18 16:00


 4/19/18 15:59  3/22/18 12:44


1 UNITS


 


 Aspirin


  (Ecotrin Tab)  81 mg  QAM


 PO  3/21/18 09:00


 4/20/18 08:59  3/22/18 08:48


81 MG


 


 Gabapentin


  (Neurontin Cap)  300 mg  TID


 PO  3/20/18 21:00


 4/19/18 20:59  3/22/18 15:58


300 MG


 


 Oxycodone HCl


  (Roxicodone


 Immediate Rel Tab)  10 mg  Q4H  PRN


 PO  3/20/18 13:45


 4/3/18 13:44  3/22/18 07:10


10 MG


 


 Pantoprazole


 Sodium


  (Protonix Tab)  40 mg  QAM


 PO  3/21/18 09:00


 4/20/18 08:59  3/22/18 08:47


40 MG


 


 Phenytoin Sodium


  (Dilantin Er Cap)  300 mg  QPM


 PO  3/20/18 21:00


 4/19/18 20:59  3/21/18 21:53


300 MG


 


 Sertraline HCl


  (Zoloft Tab)  50 mg  HS


 PO  3/20/18 21:00


 4/19/18 20:59  3/21/18 21:53


50 MG


 


 Tamsulosin HCl


  (Flomax Cap)  0.4 mg  HS


 PO  3/20/18 21:00


 4/19/18 20:59  3/21/18 21:52


0.4 MG


 


 Timolol Maleate


  (Timoptic 0.25%


 Oph Soln)  1 drops  QAM


 OPR  3/21/18 09:00


 4/20/18 08:59  3/22/18 08:48


1 DROPS


 


 Pneumococcal


 Polysaccharide


 Vaccine


  (Pneumovax-23


 Inj)  25 mcg  ONCE ONCE


 IM.  3/20/18 15:15


 3/20/18 15:16 DC 3/21/18 00:53


25 MCG


 


 Potassium


 Chloride/Sodium


 Chloride  1,000 ml @ 


 75 mls/hr  [id number]


 IV  3/21/18 10:15


 3/22/18 09:36 DC 3/22/18 02:24


125 MLS/HR


 


 Lidocaine HCl


  (Xylocaine Jelly


 2%)  1 APPLICATION


 PER URETHRA


 FOR URIN...  PRN  PRN


 EXT  3/21/18 15:30


 4/20/18 15:29  3/21/18 15:57


1 ML


 


 Calcium Carbonate


  (Tums Chew Tab)  500 mg  Q6H  PRN


 PO  3/21/18 18:30


 4/20/18 18:29  3/21/18 21:20


500 MG


 


 Senna


  (Senokot Tab)  17.2 mg  QAM


 PO  3/22/18 09:00


 4/21/18 08:59  3/22/18 08:48


17.2 MG


 


 Senna


  (Senokot Tab)  17.2 mg  1930  ONCE


 PO  3/21/18 19:30


 3/21/18 19:31 DC 3/21/18 21:18


17.2 MG


 


 Heparin Sodium


  (Porcine)


  (Heparin Sq 5000


 Unit/0.5ml)  5,000 unit  Q12


 SQ  3/22/18 12:30


 4/21/18 12:29  3/22/18 12:59


5,000 UNIT


 


 Sodium Chloride  1,000 ml @ 


 125 mls/hr  Q8H


 IV  3/22/18 09:45


 4/21/18 09:44  3/22/18 09:45


125 MLS/HR








 (Sarah Mcclendon CRNP)





Objective


Vital Signs











  Date Time  Temp Pulse Resp B/P (MAP) Pulse Ox O2 Delivery O2 Flow Rate FiO2


 


3/22/18 15:15 37.4 116 18 136/93 (107) 94   


 


3/22/18 08:12 37.4 116 16 134/86 (102) 91 Room Air  


 


3/22/18 07:10      Room Air  


 


3/22/18 01:34 37.1       


 


3/21/18 23:25      Room Air  


 


3/21/18 23:15 37.7 120 18 122/80 (94) 93 Room Air  





  125  106/68 (81)    





  137  122/81 (95)    








 (Sarah Mcclendon CRNP)





Physical Exam


Notes:


General: no distress


Eyes: normal inspection, PERLL


Respiratory: chest non tender, fine crackles bases bilaterally, no respiratory 

distress, no accessory muscle use


Cardiac: regular rate and rhythm, no rub or gallop, no murmur, no edema, no jvd


GI/: active bowel sounds, no abd pain or tenderness, soft, non distended


Extremities: left sided weakness from CVA in 2012


Neuro/Psych: alert and oriented x 3, flat affect


Skin: normal color, dry


 (Sarah Mcclendon CRNP)





Laboratory Results





Last 24 Hours








Test


  3/21/18


16:20 3/21/18


16:30 3/21/18


17:00 3/21/18


17:16


 


Osmolality 268 mOsm/kg    


 


White Blood Count  5.47 K/uL   


 


Red Blood Count  4.72 M/uL   


 


Hemoglobin  13.0 g/dL   


 


Hematocrit  39.1 %   


 


Mean Corpuscular Volume  82.8 fL   


 


Mean Corpuscular Hemoglobin  27.5 pg   


 


Mean Corpuscular Hemoglobin


Concent 


  33.2 g/dl 


  


  


 


 


Platelet Count  288 K/uL   


 


Mean Platelet Volume  9.3 fL   


 


Neutrophils (%) (Auto)  85.0 %   


 


Lymphocytes (%) (Auto)  6.0 %   


 


Monocytes (%) (Auto)  8.4 %   


 


Eosinophils (%) (Auto)  0.2 %   


 


Basophils (%) (Auto)  0.0 %   


 


Neutrophils # (Auto)  4.65 K/uL   


 


Lymphocytes # (Auto)  0.33 K/uL   


 


Monocytes # (Auto)  0.46 K/uL   


 


Eosinophils # (Auto)  0.01 K/uL   


 


Basophils # (Auto)  0.00 K/uL   


 


RDW Standard Deviation  45.3 fL   


 


RDW Coefficient of Variation  14.8 %   


 


Immature Granulocyte % (Auto)  0.4 %   


 


Immature Granulocyte # (Auto)  0.02 K/uL   


 


Sodium Level  130 mmol/L   


 


Potassium Level  4.5 mmol/L   


 


Chloride Level  95 mmol/L   


 


Carbon Dioxide Level  27 mmol/L   


 


Anion Gap  8.0 mmol/L   


 


Blood Urea Nitrogen  5 mg/dl   


 


Creatinine  0.44 mg/dl   


 


Est Creatinine Clear Calc


Drug Dose 


  192.1 ml/min 


  


  


 


 


Estimated GFR (


American) 


  > 150.0 


  


  


 


 


Estimated GFR (Non-


American 


  132.2 


  


  


 


 


BUN/Creatinine Ratio  12.2   


 


Random Glucose  156 mg/dl   


 


Calcium Level  8.3 mg/dl   


 


Total Bilirubin  0.6 mg/dl   


 


Aspartate Amino Transf


(AST/SGOT) 


  411 U/L 


  


  


 


 


Alanine Aminotransferase


(ALT/SGPT) 


  331 U/L 


  


  


 


 


Alkaline Phosphatase  385 U/L   


 


Total Protein  6.4 gm/dl   


 


Albumin  1.9 gm/dl   


 


Globulin  4.5 gm/dl   


 


Albumin/Globulin Ratio  0.4   


 


Thyroid Stimulating Hormone


(TSH) 


  0.675 uIu/ml 


  


  


 


 


Urine Color   DK YELLOW  


 


Urine Appearance   CLOUDY  


 


Urine pH   5.5  


 


Urine Specific Gravity   1.025  


 


Urine Protein   TRACE  


 


Urine Glucose (UA)   NEG  


 


Urine Ketones   1+  


 


Urine Occult Blood   1+  


 


Urine Nitrite   POS  


 


Urine Bilirubin   NEG  


 


Urine Urobilinogen   POS  


 


Urine Leukocyte Esterase   TRACE  


 


Urine WBC (Auto)   1-5 /hpf  


 


Urine RBC (Auto)   >30 /hpf  


 


Urine Hyaline Casts (Auto)   5-10 /lpf  


 


Urine Epithelial Cells (Auto)   10-20 /lpf  


 


Urine Bacteria (Auto)   NEG  


 


Urine Osmolality   719 mOms/kg  


 


Urine Random Sodium   38 mEq/L  


 


Urine Random Potassium   64.0 mEq/L  


 


Bedside Glucose    172 mg/dl 


 


Test


  3/21/18


18:45 3/21/18


20:34 3/22/18


08:09 3/22/18


08:14


 


Random Cortisol 43.85 mcg/dl    


 


Phenytoin (Dilantin) Level 4.0 mcg/mL    


 


Bedside Glucose  157 mg/dl  204 mg/dl  


 


White Blood Count    6.07 K/uL 


 


Red Blood Count    4.09 M/uL 


 


Hemoglobin    11.0 g/dL 


 


Hematocrit    33.5 % 


 


Mean Corpuscular Volume    81.9 fL 


 


Mean Corpuscular Hemoglobin    26.9 pg 


 


Mean Corpuscular Hemoglobin


Concent 


  


  


  32.8 g/dl 


 


 


Platelet Count    234 K/uL 


 


Mean Platelet Volume    8.8 fL 


 


Neutrophils (%) (Auto)    86.0 % 


 


Lymphocytes (%) (Auto)    3.6 % 


 


Monocytes (%) (Auto)    9.9 % 


 


Eosinophils (%) (Auto)    0.2 % 


 


Basophils (%) (Auto)    0.0 % 


 


Neutrophils # (Auto)    5.22 K/uL 


 


Lymphocytes # (Auto)    0.22 K/uL 


 


Monocytes # (Auto)    0.60 K/uL 


 


Eosinophils # (Auto)    0.01 K/uL 


 


Basophils # (Auto)    0.00 K/uL 


 


RDW Standard Deviation    44.5 fL 


 


RDW Coefficient of Variation    14.8 % 


 


Immature Granulocyte % (Auto)    0.3 % 


 


Immature Granulocyte # (Auto)    0.02 K/uL 


 


Prothrombin Time    19.4 SECONDS 


 


Prothromb Time International


Ratio 


  


  


  1.9 


 


 


Sodium Level    127 mmol/L 


 


Potassium Level    4.1 mmol/L 


 


Chloride Level    96 mmol/L 


 


Carbon Dioxide Level    24 mmol/L 


 


Anion Gap    8.0 mmol/L 


 


Blood Urea Nitrogen    5 mg/dl 


 


Creatinine    0.34 mg/dl 


 


Est Creatinine Clear Calc


Drug Dose 


  


  


  248.6 ml/min 


 


 


Estimated GFR (


American) 


  


  


  > 150.0 


 


 


Estimated GFR (Non-


American 


  


  


  147.0 


 


 


BUN/Creatinine Ratio    14.5 


 


Random Glucose    173 mg/dl 


 


Lactic Acid Level    0.8 mmol/L 


 


Calcium Level    7.7 mg/dl 


 


Magnesium Level    2.0 mg/dl 


 


Total Bilirubin    0.6 mg/dl 


 


Aspartate Amino Transf


(AST/SGOT) 


  


  


  280 U/L 


 


 


Alanine Aminotransferase


(ALT/SGPT) 


  


  


  239 U/L 


 


 


Alkaline Phosphatase    292 U/L 


 


Total Protein    5.3 gm/dl 


 


Albumin    1.6 gm/dl 


 


Globulin    3.7 gm/dl 


 


Albumin/Globulin Ratio    0.4 


 


Lipase    76 U/L 


 


Test


  3/22/18


10:44 


  


  


 


 


Hepatitis B Surface Antigen NEG    








 (Sarah Mcclendon, JOSE EDUARDO)





Assessment and Plan


Mr. Beckham is a 53 y/o male with PMHx of T2DM with Peripheral Neuropathy and 

CVA with L Residual Deficits who is S/P Open Retroperitoneal Mass Bx, 

Excisional Bx of Soft Tissue of L Lumbar Mass, and A Port in L Jugular. 

Hospitalist consulted for tachycardia and weakness.





Sinus Tachycardia: Pain Response vs Hypovolemia vs PE


- EKG reviewed that shows sinus tachy without ischemic changes or other 

worrisome changes


- Reporting continued abdominal pain even with pain medication - some 

improvement today - requiring 4mg morphine about every 5 hours 


- hyponatremic but otherwise electrolytes wnl


- CT negative for PE





Hyponatremia


- likely due to dehydration - will hydrate with NSS at 125 ml/hr 


- Serum osm low at 268, urine osm high at 719, random urine sodium at 38, 

random cortisol and TSH wnl 


- if hyponatremia does not improve with IVF, may have to consider SIADH 

possibility especially given neoplastic process





Elevated LFTs


- appears chronic - trending down 


- INR elevated at 1.9 - possible due to poor po intake?


- hepatitis panel pending





Anemia


- iron panel pending





Urinary Retention with BPH:


- KUB - SBO vs ileus - no nausea or vomiting - will defer to general surgery 

for intervention


- Continue Jay 


-  UC showed no growth


- Continue Flomax 0.4 mg HS





T2DM with Peripheral Neuropathy:


- tighten SS as sugars somewhat elevated, bsgs AC& HS 


- continue Gabapentin 300 mg TID





CVA with L Residual Deficits/history of seizure


- ASA 81 mg daily; Will defer Plavix 75 mg daily to primary team when they 

would like to continued


- Last seizure 2012 - serum Dilantin a bit low however as seizure history is 

remote, will leave dose as it is and patient can follow up outpatient - 

continue Dilantin 300 mg daily and Zoloft 50 mg HS








Disposition:


- Will await further testing to give further recommendations; would encourage 

ambulation when possible


 (Sarah Mcclendon, JOSE EDUARDO)





Reviewed:  Pt Seen/Exam by Me


 (Dede Marcos MD)


History


CRNP supervision Note:





I interviewed and examined the patient. Discussed with JOSE EDUARDO Mcclendon and agree 

with findings and plan as documented in the note. Any exceptions or 

clarifications are listed here:





Patient complains of pain in his abdomen.  He denies chest pain or shortness of 

breath.  He is passing flatus but no stool yet.  He is making urine.  Remains 

in sinus tachycardia.





Vitals reviewed


Very flat affect, NAD, alert awake and oriented


Regular rhythm with tachycardia, no MGR


Lungs clear to auscultation bilaterally


Abdomen hypoactive bowel sounds, soft but mildly distended, positive diffuse 

tenderness to palpation without guarding or rebound tenderness, dressings are 

clean dry and intact


Extremities no edema








Patient is a 52-year-old male with a history of seizure disorder, DM 2, 

diabetic peripheral neuropathy, history of CVA with left-sided residual deficits

, here with diffuse bulky lymphadenopathy now status post open retroperitoneal 

mass biopsy and excisional biopsy of soft tissue left lumbar mass as well as a 

port placement.  Hospitalist service consulted for persistent tachycardia and 

weakness.  Also with hyponatremia


-Sinus tachycardia-PE ruled out with CTA of the chest today, likely secondary 

to hypovolemia and pain.  Orthostatics are positive


-Increase IV fluids to normal saline 150 mL's per hour


-Follow PRP in the morning, sodium levels were mildly low 1 month ago, but now 

are lower likely due to hypovolemia and dehydration


-Elevated LFTs were mildly elevated 1 month ago, and now significantly 

elevated.  Checking hepatitis panel, but seems most likely due to possible 

infiltrative process such as a lymphoma-we will know more after biopsies return 

from the surgery.  Continue to follow LFTs and INR








Documented By:  Dede Marcos


 (Dede Marcos MD)

## 2018-03-22 NOTE — DIAGNOSTIC IMAGING REPORT
GALLBLADDER-ABD LIMITED



CLINICAL HISTORY: 52 years-old Male presenting with elevated lft. 



TECHNIQUE: Real-time grayscale and limited color Doppler ultrasound imaging of

the abdomen limited to the right upper quadrant was performed. 



COMPARISON: CT from 2/22/2018.



FINDINGS:



Pancreas: Largely obscured due to overlying bowel gas and the presence of a

bandage.



Liver: Subtle hypoechogenicity with normal echotexture. The liver measures 16.5

cm in maximal sagittal dimension. No sonographic evidence of hepatic mass. Main

portal vein patent with normal directional flow.



Biliary: No intrahepatic biliary ductal dilatation. Common bile duct measures up

to 5 mm in diameter.



Gallbladder: Gallbladder sludge. No evidence of gallstones, gallbladder wall

thickening, gallbladder distention, or pericholecystic fluid or inflammatory

change.    



Right kidney: The echogenicity of the right kidney is greater than that of the

adjacent liver parenchyma. No hydronephrosis.



Ascites: None.



Other: None.



IMPRESSION: 

1.  Gallbladder sludge. No cholelithiasis, biliary ductal dilatation, or

cholecystitis. Evaluation was limited due to inability to performed decubitus

imaging and poor breath-holding.



2.  Hypoechogenic liver parenchyma could be seen in the setting of hepatitis.







Electronically signed by:  Daniel June M.D.

3/22/2018 8:03 AM



Dictated Date/Time:  3/22/2018 7:59 AM

## 2018-03-23 VITALS — HEART RATE: 111 BPM | SYSTOLIC BLOOD PRESSURE: 128 MMHG | DIASTOLIC BLOOD PRESSURE: 79 MMHG

## 2018-03-23 VITALS
DIASTOLIC BLOOD PRESSURE: 75 MMHG | HEART RATE: 105 BPM | SYSTOLIC BLOOD PRESSURE: 118 MMHG | OXYGEN SATURATION: 96 % | TEMPERATURE: 99.14 F

## 2018-03-23 VITALS
HEART RATE: 105 BPM | OXYGEN SATURATION: 95 % | DIASTOLIC BLOOD PRESSURE: 83 MMHG | TEMPERATURE: 97.88 F | SYSTOLIC BLOOD PRESSURE: 125 MMHG

## 2018-03-23 VITALS
HEART RATE: 105 BPM | SYSTOLIC BLOOD PRESSURE: 141 MMHG | DIASTOLIC BLOOD PRESSURE: 87 MMHG | OXYGEN SATURATION: 91 % | TEMPERATURE: 98.42 F

## 2018-03-23 VITALS
HEART RATE: 94 BPM | SYSTOLIC BLOOD PRESSURE: 133 MMHG | DIASTOLIC BLOOD PRESSURE: 83 MMHG | TEMPERATURE: 97.7 F | OXYGEN SATURATION: 96 %

## 2018-03-23 VITALS — TEMPERATURE: 98.06 F | HEART RATE: 120 BPM | SYSTOLIC BLOOD PRESSURE: 142 MMHG | DIASTOLIC BLOOD PRESSURE: 80 MMHG

## 2018-03-23 VITALS
HEART RATE: 96 BPM | DIASTOLIC BLOOD PRESSURE: 84 MMHG | OXYGEN SATURATION: 97 % | TEMPERATURE: 98.24 F | SYSTOLIC BLOOD PRESSURE: 130 MMHG

## 2018-03-23 VITALS — SYSTOLIC BLOOD PRESSURE: 119 MMHG | DIASTOLIC BLOOD PRESSURE: 61 MMHG

## 2018-03-23 VITALS — TEMPERATURE: 98.42 F

## 2018-03-23 VITALS — SYSTOLIC BLOOD PRESSURE: 138 MMHG | DIASTOLIC BLOOD PRESSURE: 85 MMHG

## 2018-03-23 VITALS — DIASTOLIC BLOOD PRESSURE: 88 MMHG | SYSTOLIC BLOOD PRESSURE: 137 MMHG

## 2018-03-23 VITALS
OXYGEN SATURATION: 96 % | TEMPERATURE: 98.06 F | SYSTOLIC BLOOD PRESSURE: 138 MMHG | DIASTOLIC BLOOD PRESSURE: 88 MMHG | HEART RATE: 91 BPM

## 2018-03-23 LAB
ALBUMIN SERPL-MCNC: 1.6 GM/DL (ref 3.4–5)
ALP SERPL-CCNC: 294 U/L (ref 45–117)
ALT SERPL-CCNC: 262 U/L (ref 12–78)
AST SERPL-CCNC: 318 U/L (ref 15–37)
BASOPHILS # BLD: 0 K/UL (ref 0–0.2)
BASOPHILS NFR BLD: 0 %
BUN SERPL-MCNC: 5 MG/DL (ref 7–18)
CALCIUM SERPL-MCNC: 7.6 MG/DL (ref 8.5–10.1)
CO2 SERPL-SCNC: 22 MMOL/L (ref 21–32)
CREAT SERPL-MCNC: 0.29 MG/DL (ref 0.6–1.4)
EOS ABS #: 0.01 K/UL (ref 0–0.5)
EOSINOPHIL NFR BLD AUTO: 239 K/UL (ref 130–400)
GLUCOSE SERPL-MCNC: 184 MG/DL (ref 70–99)
HCT VFR BLD CALC: 35.9 % (ref 42–52)
HEPATITIS A IGM TC 51813E: (no result)
HEPATITIS B CORE IGM  TC51854R: (no result)
HGB BLD-MCNC: 11.5 G/DL (ref 14–18)
IG#: 0.01 K/UL (ref 0–0.02)
IMM GRANULOCYTES NFR BLD AUTO: 4.9 %
INR PPP: 1.7 (ref 0.9–1.1)
LYMPHOCYTES # BLD: 0.25 K/UL (ref 1.2–3.4)
MCH RBC QN AUTO: 26.1 PG (ref 25–34)
MCHC RBC AUTO-ENTMCNC: 32 G/DL (ref 32–36)
MCV RBC AUTO: 81.6 FL (ref 80–100)
MONO ABS #: 0.38 K/UL (ref 0.11–0.59)
MONOCYTES NFR BLD: 7.4 %
NEUT ABS #: 4.48 K/UL (ref 1.4–6.5)
NEUTROPHILS # BLD AUTO: 0.2 %
NEUTROPHILS NFR BLD AUTO: 87.3 %
PHOSPHATE SERPL-MCNC: 1.9 MG/DL (ref 2.5–4.9)
PMV BLD AUTO: 9 FL (ref 7.4–10.4)
POTASSIUM SERPL-SCNC: 3.8 MMOL/L (ref 3.5–5.1)
PROT SERPL-MCNC: 5.5 GM/DL (ref 6.4–8.2)
RED CELL DISTRIBUTION WIDTH CV: 14.8 % (ref 11.5–14.5)
RED CELL DISTRIBUTION WIDTH SD: 44.5 FL (ref 36.4–46.3)
SODIUM RANDOM URINE: 88 MEQ/L
SODIUM SERPL-SCNC: 127 MMOL/L (ref 136–145)
T4 FREE SERPL-MCNC: 19 MOMS/KG (ref 500–800)
TRANSFERRIN SERPL-MCNC: 88 MG/DL (ref 200–360)
WBC # BLD AUTO: 5.13 K/UL (ref 4.8–10.8)

## 2018-03-23 RX ADMIN — Medication SCH MG: at 10:00

## 2018-03-23 RX ADMIN — STANDARDIZED SENNA CONCENTRATE SCH MG: 8.6 TABLET ORAL at 10:01

## 2018-03-23 RX ADMIN — MORPHINE SULFATE PRN MG: 4 INJECTION, SOLUTION INTRAMUSCULAR; INTRAVENOUS at 23:46

## 2018-03-23 RX ADMIN — OXYCODONE HYDROCHLORIDE PRN MG: 5 TABLET ORAL at 15:37

## 2018-03-23 RX ADMIN — TIMOLOL MALEATE SCH DROPS: 5 SOLUTION OPHTHALMIC at 10:00

## 2018-03-23 RX ADMIN — SODIUM CHLORIDE SCH MLS/HR: 900 INJECTION, SOLUTION INTRAVENOUS at 15:37

## 2018-03-23 RX ADMIN — OXYCODONE HYDROCHLORIDE AND ACETAMINOPHEN PRN TAB: 5; 325 TABLET ORAL at 08:30

## 2018-03-23 RX ADMIN — GABAPENTIN SCH MG: 300 CAPSULE ORAL at 13:21

## 2018-03-23 RX ADMIN — EXTENDED PHENYTOIN SODIUM SCH MG: 100 CAPSULE ORAL at 20:19

## 2018-03-23 RX ADMIN — INSULIN GLARGINE SCH UNITS: 100 INJECTION, SOLUTION SUBCUTANEOUS at 21:41

## 2018-03-23 RX ADMIN — GABAPENTIN SCH MG: 300 CAPSULE ORAL at 10:01

## 2018-03-23 RX ADMIN — INSULIN ASPART SCH UNITS: 100 INJECTION, SOLUTION INTRAVENOUS; SUBCUTANEOUS at 10:09

## 2018-03-23 RX ADMIN — OXYCODONE HYDROCHLORIDE AND ACETAMINOPHEN PRN TAB: 5; 325 TABLET ORAL at 13:23

## 2018-03-23 RX ADMIN — SODIUM CHLORIDE SCH MLS/HR: 900 INJECTION, SOLUTION INTRAVENOUS at 23:37

## 2018-03-23 RX ADMIN — HEPARIN SODIUM SCH UNIT: 10000 INJECTION, SOLUTION INTRAVENOUS; SUBCUTANEOUS at 21:35

## 2018-03-23 RX ADMIN — INSULIN ASPART SCH UNITS: 100 INJECTION, SOLUTION INTRAVENOUS; SUBCUTANEOUS at 13:20

## 2018-03-23 RX ADMIN — PANTOPRAZOLE SCH MG: 40 TABLET, DELAYED RELEASE ORAL at 10:01

## 2018-03-23 RX ADMIN — TAMSULOSIN HYDROCHLORIDE SCH MG: 0.4 CAPSULE ORAL at 20:20

## 2018-03-23 RX ADMIN — OXYCODONE HYDROCHLORIDE PRN MG: 5 TABLET ORAL at 20:18

## 2018-03-23 RX ADMIN — SODIUM CHLORIDE SCH MLS/HR: 900 INJECTION, SOLUTION INTRAVENOUS at 10:00

## 2018-03-23 RX ADMIN — INSULIN ASPART SCH UNITS: 100 INJECTION, SOLUTION INTRAVENOUS; SUBCUTANEOUS at 21:34

## 2018-03-23 RX ADMIN — POLYETHYLENE GLYCOL 3350 PRN GM: 17 POWDER, FOR SOLUTION ORAL at 15:37

## 2018-03-23 RX ADMIN — HEPARIN SODIUM SCH UNIT: 10000 INJECTION, SOLUTION INTRAVENOUS; SUBCUTANEOUS at 10:10

## 2018-03-23 RX ADMIN — SERTRALINE HYDROCHLORIDE SCH MG: 50 TABLET, FILM COATED ORAL at 20:18

## 2018-03-23 RX ADMIN — INSULIN ASPART SCH UNITS: 100 INJECTION, SOLUTION INTRAVENOUS; SUBCUTANEOUS at 18:32

## 2018-03-23 RX ADMIN — OXYCODONE HYDROCHLORIDE PRN MG: 5 TABLET ORAL at 10:11

## 2018-03-23 RX ADMIN — GABAPENTIN SCH MG: 300 CAPSULE ORAL at 20:18

## 2018-03-23 RX ADMIN — SODIUM CHLORIDE SCH MLS/HR: 900 INJECTION, SOLUTION INTRAVENOUS at 03:03

## 2018-03-23 NOTE — PROGRESS NOTE
Progress Note


Date of Service


Mar 23, 2018.





Progress Note


discussed with Dr. Nichole after labs, pathology returned this morning


favor keeping the patient here in light of LFT, INR, electrolyte abnormalities


consider starting chemo as inpatient?


also discussed with Dr. White

## 2018-03-23 NOTE — ONCOLOGY CONSULTATION
DATE OF CONSULTATION:  03/23/2018

 

REASON FOR CONSULTATION:  Diffuse lymphadenopathy suspicious for

lymphoproliferative disorder in a 52-year-old gentleman.

 

HISTORY OF PRESENT ILLNESS:  Arsenio is a pleasant 52-year-old gentleman

actually well known to me, recently seen in consultation on 03/13/2018 with a

diagnosis of retroperitoneal lymphadenopathy.  He was admitted to [facility] on 03/21/2018 to undergo laparoscopic retroperitoneal

lymphadenectomy performed by Dr. Nichole.  The operative procedure went

reasonably well; however, postop patient developed a low grade fever as

well as increase in liver transaminases and mild coagulopathy.  Thus, the

reason for continued observation.

 

Arsenio again is a very pleasant 52-year-old gentleman who was [facility] at the kind request of his primary physician for

evaluation and management of a newly discovered retroperitoneal

lymphadenopathy.  Arsenio had been symptomatic for the past several weeks,

estimates weight loss about 8 pounds with associated anorexia.  Additionally,

Arsenio was experiencing pain mainly involving the epigastrium with radiation

to the back.  CT scan of the abdomen and pelvis dated 02/20/2018 had revealed

marked adenopathy within the retroperitoneal and periaortic regions.  The

largest of these nodes measured 6.2 cm displacing the pancreatic head, which

I suspect is attributable to his current pain issues.  Arsenio had also

recently undergone PET which again reveals multiple FDG-avid cervical,

mediastinal, hilar, and abdominal and pelvic lymph nodes.  Dr. Nichole

contacted me by phone alerting me to Arsenio's current clinical situation and

asked me to assist in his postoperative recovery.

 

PAST MEDICAL HISTORY:  Significant for seizure disorder, peripheral

neuropathy, hypercholesterolemia, glaucoma, depression, transient cerebral

ischemia and essential hypertension as well as seizure disorder.

 

PAST SURGICAL HISTORY:  Cyst removal and recent laparoscopic lymphadenectomy.

 

MEDICATIONS:  Oxycodone 10 mg p.o. q. 4-6 hours p.r.n. for pain, aspirin 81

mg p.o. daily, atorvastatin 20 mg p.o. daily, Dilantin 200 mg p.o. b.i.d.,

gabapentin 300 mg p.o. t.i.d., glyburide 1.25 mg p.o. daily, magnesium 250 mg

p.o. b.i.d., metformin 500 mg p.o. t.i.d., Protonix 40 mg p.o. daily, Plavix

75 mg p.o. daily, tamsulosin 0.4 mg 24-hour extended release capsule daily,

Zoloft 50 mg p.o. daily.

 

ALLERGIES:  No known drug allergies.

 

SOCIAL HISTORY:  The patient lives with his parents.  Permanently disabled

attributable to previous CVA.  He is a reformed smoker and reformed drinker.

 

FAMILY HISTORY:  Mother is alive and well.  There is a family history of both

lung cancer and non-Hodgkin's lymphoma.  In regards to the non-Hodgkin's

lymphoma, his mother is actually currently under Dr. Orellana's care and in

remission.

 

REVIEW OF SYSTEMS:  Pertinent only to postoperative abdominal discomfort and

low-grade fever.  The remainder of the systems are otherwise unremarkable.

 

PHYSICAL EXAMINATION:

GENERAL:  Arsenio is a 52-year-old gentleman with relatively flat affect, in

no acute distress.

VITAL SIGNS:  Temperature 36.9, pulse 105, respiratory rate 18, blood

pressure 141/87.

SKIN:  Pale without rash or lesion.  No history of dermatoses otherwise.

HEENT:  Atraumatic, normocephalic.

EYES:  PERRLA, EOMI.  Sclerae are nonicteric.  Nares are patent.  Throat

clear.  Tongue midline.  Mucous membranes are moist.  No buccal lesions or

ulcerations.

NECK:  Supple.

HEART:  Tachycardic, but regular.

LUNGS:  Clear to auscultation.

ABDOMEN:  Mildly distended.  Bowel sounds are hypoactive.  No palpable

hepatosplenomegaly.  No rigidity or guarding.

EXTREMITIES:  No clubbing, cyanosis or edema.  Arsenio has a left-sided motor

deficit again attributable to previous CVA.

NEUROLOGIC:  Not performed.

 

LABORATORY DATA:  PT 19.4, INR 1.9.  WBC count 6070, hemoglobin 11, platelet

count 234,000.  Sodium 127, potassium 4.1, chloride 96, carbon dioxide 24,

BUN 5, creatinine 0.34, , , alkaline phosphatase 249.  Albumin

1.6.

 

IMPRESSION:

1.  Status post operative day #2, laparoscopic retroperitoneal

lymphadenectomy.

2.  Low grade fever.

3.  Elevated liver transaminases.

4.  Hypoalbuminemia.

5.  Coagulopathy, most likely attributable to nutritional deficit, (vitamin

K.).

6.  Probable lymphoproliferative disorder.  Biopsy results pending.

 

PLAN:  Arsenio is a pleasant 52-year-old gentleman well known to [facility] seen in initial consultation on 03/13/2018 when he presented

with a newly discovered retroperitoneal lymphadenopathy.  From a radiographic

standpoint including most recent PET scan performed on 03/19/2018, this

gentleman clearly suffers from a lymphoproliferative process.  Dr. Nichole

took him to the operating room on the 21st and successfully obtained tissue

for diagnosis.  Pathologic analysis is pending at time of dictation. 

However, postoperatively developed low grade fever as well as liver

transaminitis and mild coagulopathy.  In light of his albumin being 1.6, I

suspect this gentleman has a vitamin K deficiency, but nonetheless will

ask the lab to run a PT mixing study to confirm.  I have no problem with

giving this gentleman empiric vitamin K and repeating his PT and INR 

next day.  Generally speaking, 10 mg dose given subcutaneously is adequate. 

Obviously, he needs nutritional support and hopefully once we begin to treat

his lymphoma, his appetite will fall improve.  However, the question of

elevated liver transaminases remain.  Conceivably this may be secondary to

disease progression as well.  I anticipate he will receive a rituximab based

regimen and therefore hepatitis panels will be necessary leading up to

treatment.  I would appreciate the hepatitis panel be ordered while

inpatient.  We will continue to follow him periodically during his hospital

stay.  I anticipate an expedient outpatient followup upon discharge to begin

treatment.

 

Thank you very much for allowing me to participate in his care.  If you have

any questions or concerns, contact me any time.  I will be on call throughout

the weekend.

 

 

 

JACK

## 2018-03-23 NOTE — PROGRESS NOTE
Subjective


Date of Service:


Mar 23, 2018.


Subjective


Pt evaluation today including:  conversation w/ patient, physical exam, chart 

review, lab review, review of studies (liver u/s), conversation w/ consultant (

heme/onc; gen surg), review of inpatient medication list


Pain:  abdomen/back


PO Intake:  improving slowly


Voiding:  bautista catheter in place


pt's main complaint is that of abd pain & back pain


no worse than yesterday 


denies dyspnea





Review of Systems


Constitutional:  No fever


Respiratory:  No cough


Cardiac:  No chest pain


Abdomen:  + pain, + constipation, No nausea, No vomiting





Objective


Vital Signs











  Date Time  Temp Pulse Resp B/P (MAP) Pulse Ox O2 Delivery O2 Flow Rate FiO2


 


3/23/18 16:38 36.8 96 16 130/84 (99) 97 Room Air  


 


3/23/18 15:30      Room Air  


 


3/23/18 15:24 36.6 105 18 125/83 (97) 95 Room Air  


 


3/23/18 14:00  111  127/84 (98)    


 


3/23/18 14:00  124  128/79 (95)    





  114  132/78 (96)    


 


3/23/18 09:57    116/71 (86)    





    119/61 (80)    





    108/73 (85)    


 


3/23/18 08:10      Room Air  


 


3/23/18 07:02 36.9 105 18 141/87 (105) 91 Room Air  





  107      


 


3/23/18 06:15 36.7 106  142/89 (106)    





  105  122/81 (95)    





  120  120/80 (93)    


 


3/23/18 03:06 36.9       


 


3/22/18 23:32      Room Air  


 


3/22/18 22:56 38.0 114 18 130/85 (100) 96 Room Air  











Physical Exam


General Appearance:  no apparent distress, + pertinent finding (flat affect)


ENT:  pharynx normal


Neck:  no JVD


Respiratory/Chest:  lungs clear, no respiratory distress, no accessory muscle 

use


Cardiovascular:  no gallop, no murmur, + tachycardia


Abdomen:  normal bowel sounds, non tender, no organomegaly, + distended (mild)


Extremities:  no pedal edema


Neurologic/Psychiatric:  alert, oriented x 3, + depressed affect (?), + 

pertinent finding (LUE weakness/contracture )


Skin:  + pertinent finding (incisions on abdominal wall clean )





Laboratory Results





Last 24 Hours








Test


  3/22/18


20:59 3/23/18


00:00 3/23/18


08:03 3/23/18


08:36


 


Bedside Glucose 178 mg/dl   171 mg/dl  


 


Urine Osmolality  19 mOms/kg   


 


Urine Random Sodium  88 mEq/L   


 


White Blood Count    5.13 K/uL 


 


Red Blood Count    4.40 M/uL 


 


Hemoglobin    11.5 g/dL 


 


Hematocrit    35.9 % 


 


Mean Corpuscular Volume    81.6 fL 


 


Mean Corpuscular Hemoglobin    26.1 pg 


 


Mean Corpuscular Hemoglobin


Concent 


  


  


  32.0 g/dl 


 


 


Platelet Count    239 K/uL 


 


Mean Platelet Volume    9.0 fL 


 


Neutrophils (%) (Auto)    87.3 % 


 


Lymphocytes (%) (Auto)    4.9 % 


 


Monocytes (%) (Auto)    7.4 % 


 


Eosinophils (%) (Auto)    0.2 % 


 


Basophils (%) (Auto)    0.0 % 


 


Neutrophils # (Auto)    4.48 K/uL 


 


Lymphocytes # (Auto)    0.25 K/uL 


 


Monocytes # (Auto)    0.38 K/uL 


 


Eosinophils # (Auto)    0.01 K/uL 


 


Basophils # (Auto)    0.00 K/uL 


 


RDW Standard Deviation    44.5 fL 


 


RDW Coefficient of Variation    14.8 % 


 


Immature Granulocyte % (Auto)    0.2 % 


 


Immature Granulocyte # (Auto)    0.01 K/uL 


 


Prothrombin Time    18.1 SECONDS 


 


Prothromb Time International


Ratio 


  


  


  1.7 


 


 


Sodium Level    127 mmol/L 


 


Potassium Level    3.8 mmol/L 


 


Chloride Level    96 mmol/L 


 


Carbon Dioxide Level    22 mmol/L 


 


Anion Gap    9.0 mmol/L 


 


Blood Urea Nitrogen    5 mg/dl 


 


Creatinine    0.29 mg/dl 


 


Est Creatinine Clear Calc


Drug Dose 


  


  


  291.5 ml/min 


 


 


Estimated GFR (


American) 


  


  


  > 150.0 


 


 


Estimated GFR (Non-


American 


  


  


  > 150.0 


 


 


BUN/Creatinine Ratio    16.7 


 


Random Glucose    184 mg/dl 


 


Calcium Level    7.6 mg/dl 


 


Phosphorus Level    1.9 mg/dl 


 


Magnesium Level    1.9 mg/dl 


 


Iron Level    20 mcg/dl 


 


Total Iron Binding Capacity    112 mcg/dl 


 


Transferrin    88 mg/dl 


 


Transferrin % Saturation    16 % 


 


Ferritin    5842.7 ng/ml 


 


Total Bilirubin    0.7 mg/dl 


 


Direct Bilirubin    0.4 mg/dl 


 


Aspartate Amino Transf


(AST/SGOT) 


  


  


  318 U/L 


 


 


Alanine Aminotransferase


(ALT/SGPT) 


  


  


  262 U/L 


 


 


Alkaline Phosphatase    294 U/L 


 


Total Protein    5.5 gm/dl 


 


Albumin    1.6 gm/dl 


 


Test


  3/23/18


12:11 3/23/18


16:40 3/23/18


17:09 


 


 


Bedside Glucose 281 mg/dl   171 mg/dl  


 


Ammonia  < 10.0 umol/L   











Assessment and Plan


53yo male - 





1.  POD #2, s/p Laparoscopy, open biopsy of retroperitoneal mass with frozen 

sections, A-port placement in left internal jugular, excision of left flank


mass.  Doing well from surgical standpoint.  Clear for home from surgery's 

opinion. 





2.  tachycardia - CTA neg for PE.  Has minimal acute blood loss anemia and thus 

bleeding unlikely as cause.  TSH w/o hyperthyroidism.


He reports significant pain - suspect the high HR is from such. 


Treat the pain; follow. 





3.  h/o stroke with resulting left-sided hemiplegia - noted.  Cont asa for 

secondary stroke prevention. 





4.  seizure disorder - corrected dilantin level taking into account severe 

hypoalbuminemia is 8.  No change in seizure meds for now.  May d/w neurology 

for more guidance, however.





5.  hyponatremia - repeat urine osm and urine Na.  He appears euvolemic today.  

Reduce fluid rate; repeat BMP am. 





6.  abnormal LFTs - due to new dx of Hodgkin's lymphoma?  However, PET scan did 

not show obvious liver involvement. 


Liver u/s normal.  


Acute hep profile thus far negative. 


Ferritin markedly elevated, but transferrin saturation is low.  


Could dilantin cause this?


Ammonia level wnl. 


Send CHAPIS; consider ceruloplasmin, alpha-1 antitripsin level, etc to be 

complete. 


Repeat CMP and INR in am. 





7.  coagulopathy - elevated INR - may represent vitamin K def. 


Vitamin K 10mg IV x 1 with repeat INR in am. 


mixing study sent by heme/onc. 





8.  T2DM - add lantus 10 units HS due to ongoing hyperglycemia.  Novolog meals.





9.  DVT proph - heparin.





Cleared by PT for home.


May need OT at d/c. 


Will cont to follow.


Continued [facility] stay due to:  multiple IV medications needed


Discharge planning:  home

## 2018-03-23 NOTE — SURGERY PROGRESS NOTE
DATE: 03/23/2018

 

Arsenio appears a little bit better this morning.  His last vitals showed a

temperature of 36.9, pulse still elevated but decreasing 105-107, blood

pressure 141/87, O2 sats 91 on room air, respiration 18.  His I&O is fairly

balanced.  His urine output was 300 overnight.  Laboratory this morning is

pending.  His abdomen is softly distended but is nontender.  He does have a

Jay catheter in.  Laboratory wise, yesterday his coagulation showed INR of

1.9.  I discussed the situation with Dr. Alberto, the oncologist, regarding this

as being concerning and also with elevated liver function test.  Dr. Alberto was

going to see him this morning.  From my point of view, the patient, unless

the medical service have cleared it with them, can probably be discharged

today.  He seems to be tolerating his diet.  He is passing gas.  He has not

moved his bowels.  Probably discharge him with a Jay catheter and follow up

in our office in approximately 1 week to take out the Jay catheter.  We

await Dr. Alberto's consultation regarding his plan.  His path report is still

pending.

## 2018-03-23 NOTE — SURGERY PROGRESS NOTE
Surgery Progress Note


Date of Service


Mar 23, 2018.





Subjective


Post OP Day:  3


+ complaints (about still being in hospital), + pain controlled





Objective


Vital Signs:











  Date Time  Temp Pulse Resp B/P (MAP) Pulse Ox O2 Delivery O2 Flow Rate FiO2


 


3/23/18 07:02 36.9 105 18 141/87 (105) 91 Room Air  





  107      


 


3/23/18 06:15 36.7 106  142/89 (106)    





  105  122/81 (95)    





  120  120/80 (93)    


 


3/23/18 03:06 36.9       


 


3/22/18 23:32      Room Air  


 


3/22/18 22:56 38.0 114 18 130/85 (100) 96 Room Air  


 


3/22/18 19:07  115  132/85 (101)    





  118  139/84 (102)    





  125  121/82 (95)    


 


3/22/18 16:00      Room Air  


 


3/22/18 15:15 37.4 116 18 136/93 (107) 94   


 


3/22/18 08:12 37.4 116 16 134/86 (102) 91 Room Air  








Abdomen:  soft


Incision(s):  intact (dressing)


Laboratory Results:





Results Past 24 Hours








Test


  3/22/18


08:09 3/22/18


08:14 3/22/18


10:44 3/22/18


12:01 Range/Units


 


 


Bedside Glucose 204   204 70-99  mg/dl


 


White Blood Count  6.07   4.8-10.8  K/uL


 


Red Blood Count  4.09   4.7-6.1  M/uL


 


Hemoglobin  11.0   14.0-18.0  g/dL


 


Hematocrit  33.5   42-52  %


 


Mean Corpuscular Volume  81.9     fL


 


Mean Corpuscular Hemoglobin  26.9   25-34  pg


 


Mean Corpuscular Hemoglobin


Concent 


  32.8


  


  


  32-36  g/dl


 


 


Platelet Count  234   130-400  K/uL


 


Mean Platelet Volume  8.8   7.4-10.4  fL


 


Neutrophils (%) (Auto)  86.0    %


 


Lymphocytes (%) (Auto)  3.6    %


 


Monocytes (%) (Auto)  9.9    %


 


Eosinophils (%) (Auto)  0.2    %


 


Basophils (%) (Auto)  0.0    %


 


Neutrophils # (Auto)  5.22   1.4-6.5  K/uL


 


Lymphocytes # (Auto)  0.22   1.2-3.4  K/uL


 


Monocytes # (Auto)  0.60   0.11-0.59  K/uL


 


Eosinophils # (Auto)  0.01   0-0.5  K/uL


 


Basophils # (Auto)  0.00   0-0.2  K/uL


 


RDW Standard Deviation  44.5   36.4-46.3  fL


 


RDW Coefficient of Variation  14.8   11.5-14.5  %


 


Immature Granulocyte % (Auto)  0.3    %


 


Immature Granulocyte # (Auto)  0.02   0.00-0.02  K/uL


 


Prothrombin Time


  


  19.4


  


  


  9.0-12.0


SECONDS


 


Prothromb Time International


Ratio 


  1.9


  


  


  0.9-1.1  


 


 


Sodium Level  127   136-145  mmol/L


 


Potassium Level  4.1   3.5-5.1  mmol/L


 


Chloride Level  96     mmol/L


 


Carbon Dioxide Level  24   21-32  mmol/L


 


Anion Gap  8.0   3-11  mmol/L


 


Blood Urea Nitrogen  5   7-18  mg/dl


 


Creatinine


  


  0.34


  


  


  0.60-1.40


mg/dl


 


Est Creatinine Clear Calc


Drug Dose 


  248.6


  


  


   ml/min


 


 


Estimated GFR (


American) 


  > 150.0


  


  


   


 


 


Estimated GFR (Non-


American 


  147.0


  


  


   


 


 


BUN/Creatinine Ratio  14.5   10-20  


 


Random Glucose  173   70-99  mg/dl


 


Lactic Acid Level  0.8   0.4-2.0  mmol/L


 


Calcium Level  7.7   8.5-10.1  mg/dl


 


Magnesium Level  2.0   1.8-2.4  mg/dl


 


Total Bilirubin  0.6   0.2-1  mg/dl


 


Aspartate Amino Transf


(AST/SGOT) 


  280


  


  


  15-37  U/L


 


 


Alanine Aminotransferase


(ALT/SGPT) 


  239


  


  


  12-78  U/L


 


 


Alkaline Phosphatase  292     U/L


 


Total Protein  5.3   6.4-8.2  gm/dl


 


Albumin  1.6   3.4-5.0  gm/dl


 


Globulin  3.7   2.5-4.0  gm/dl


 


Albumin/Globulin Ratio  0.4   0.9-2  


 


Lipase  76     U/L


 


Hepatitis A IgM Antibody   NON-REACTIVE  NON-REACTIVE  


 


Hepatitis B Surface Antigen   NEG  NEG  


 


Hepatitis B Core IgM Antibody   NON-REACTIVE  NON-REACTIVE  


 


Hepatitis C Antibody   NON-REACTIVE  NON-REACTIVE  


 


Hepatitis C Ab Signal/Cutoff


Ratio 


  


  0.01


  


  LESS THAN 1.0  


 


 


Test


  3/22/18


17:16 3/22/18


20:59 3/23/18


04:44 


  Range/Units


 


 


Bedside Glucose 213 178   70-99  mg/dl


 


Transferrin % Saturation     20-50  %











Assessment & Plan


s/p abdominal lymph node biopsy, A-port, skin lesion excision


   HR remains 100s


   CT negative for PE


   will review with Dr. Nichole

## 2018-03-24 VITALS
TEMPERATURE: 99.14 F | HEART RATE: 102 BPM | DIASTOLIC BLOOD PRESSURE: 77 MMHG | SYSTOLIC BLOOD PRESSURE: 110 MMHG | OXYGEN SATURATION: 94 %

## 2018-03-24 VITALS
TEMPERATURE: 98.96 F | OXYGEN SATURATION: 98 % | DIASTOLIC BLOOD PRESSURE: 78 MMHG | HEART RATE: 102 BPM | SYSTOLIC BLOOD PRESSURE: 110 MMHG

## 2018-03-24 VITALS
OXYGEN SATURATION: 97 % | SYSTOLIC BLOOD PRESSURE: 146 MMHG | HEART RATE: 105 BPM | TEMPERATURE: 100.22 F | DIASTOLIC BLOOD PRESSURE: 87 MMHG

## 2018-03-24 VITALS
SYSTOLIC BLOOD PRESSURE: 120 MMHG | OXYGEN SATURATION: 98 % | HEART RATE: 94 BPM | DIASTOLIC BLOOD PRESSURE: 83 MMHG | TEMPERATURE: 98.42 F

## 2018-03-24 VITALS — SYSTOLIC BLOOD PRESSURE: 131 MMHG | DIASTOLIC BLOOD PRESSURE: 69 MMHG | HEART RATE: 105 BPM

## 2018-03-24 VITALS — OXYGEN SATURATION: 97 %

## 2018-03-24 LAB
ALBUMIN SERPL-MCNC: 1.5 GM/DL (ref 3.4–5)
ALP SERPL-CCNC: 272 U/L (ref 45–117)
ALT SERPL-CCNC: 205 U/L (ref 12–78)
AST SERPL-CCNC: 171 U/L (ref 15–37)
BUN SERPL-MCNC: 4 MG/DL (ref 7–18)
CALCIUM SERPL-MCNC: 8.1 MG/DL (ref 8.5–10.1)
CO2 SERPL-SCNC: 25 MMOL/L (ref 21–32)
CREAT SERPL-MCNC: 0.28 MG/DL (ref 0.6–1.4)
EOSINOPHIL NFR BLD AUTO: 252 K/UL (ref 130–400)
GLUCOSE SERPL-MCNC: 138 MG/DL (ref 70–99)
HCT VFR BLD CALC: 34.3 % (ref 42–52)
HGB BLD-MCNC: 11.2 G/DL (ref 14–18)
INR PPP: 1.1 (ref 0.9–1.1)
MCH RBC QN AUTO: 26.8 PG (ref 25–34)
MCHC RBC AUTO-ENTMCNC: 32.7 G/DL (ref 32–36)
MCV RBC AUTO: 82.1 FL (ref 80–100)
PMV BLD AUTO: 9.3 FL (ref 7.4–10.4)
POTASSIUM SERPL-SCNC: 3.8 MMOL/L (ref 3.5–5.1)
PROT SERPL-MCNC: 5.2 GM/DL (ref 6.4–8.2)
RED CELL DISTRIBUTION WIDTH CV: 14.9 % (ref 11.5–14.5)
RED CELL DISTRIBUTION WIDTH SD: 44.5 FL (ref 36.4–46.3)
SODIUM SERPL-SCNC: 131 MMOL/L (ref 136–145)
WBC # BLD AUTO: 4.67 K/UL (ref 4.8–10.8)

## 2018-03-24 RX ADMIN — EXTENDED PHENYTOIN SODIUM SCH MG: 100 CAPSULE ORAL at 20:03

## 2018-03-24 RX ADMIN — GABAPENTIN SCH MG: 300 CAPSULE ORAL at 20:03

## 2018-03-24 RX ADMIN — STANDARDIZED SENNA CONCENTRATE SCH MG: 8.6 TABLET ORAL at 09:40

## 2018-03-24 RX ADMIN — INSULIN ASPART SCH UNITS: 100 INJECTION, SOLUTION INTRAVENOUS; SUBCUTANEOUS at 21:14

## 2018-03-24 RX ADMIN — INSULIN GLARGINE SCH UNITS: 100 INJECTION, SOLUTION SUBCUTANEOUS at 21:13

## 2018-03-24 RX ADMIN — OXYCODONE HYDROCHLORIDE AND ACETAMINOPHEN PRN TAB: 5; 325 TABLET ORAL at 20:20

## 2018-03-24 RX ADMIN — HEPARIN SODIUM SCH UNIT: 10000 INJECTION, SOLUTION INTRAVENOUS; SUBCUTANEOUS at 21:13

## 2018-03-24 RX ADMIN — INSULIN ASPART SCH UNITS: 100 INJECTION, SOLUTION INTRAVENOUS; SUBCUTANEOUS at 09:44

## 2018-03-24 RX ADMIN — HEPARIN SODIUM SCH UNIT: 10000 INJECTION, SOLUTION INTRAVENOUS; SUBCUTANEOUS at 09:45

## 2018-03-24 RX ADMIN — GABAPENTIN SCH MG: 300 CAPSULE ORAL at 09:39

## 2018-03-24 RX ADMIN — TIMOLOL MALEATE SCH DROPS: 5 SOLUTION OPHTHALMIC at 09:40

## 2018-03-24 RX ADMIN — SERTRALINE HYDROCHLORIDE SCH MG: 50 TABLET, FILM COATED ORAL at 20:03

## 2018-03-24 RX ADMIN — SODIUM CHLORIDE SCH MLS/HR: 900 INJECTION, SOLUTION INTRAVENOUS at 13:19

## 2018-03-24 RX ADMIN — INSULIN ASPART SCH UNITS: 100 INJECTION, SOLUTION INTRAVENOUS; SUBCUTANEOUS at 17:49

## 2018-03-24 RX ADMIN — OXYCODONE HYDROCHLORIDE PRN MG: 5 TABLET ORAL at 05:22

## 2018-03-24 RX ADMIN — Medication SCH MG: at 09:40

## 2018-03-24 RX ADMIN — INSULIN ASPART SCH UNITS: 100 INJECTION, SOLUTION INTRAVENOUS; SUBCUTANEOUS at 12:00

## 2018-03-24 RX ADMIN — PANTOPRAZOLE SCH MG: 40 TABLET, DELAYED RELEASE ORAL at 09:39

## 2018-03-24 RX ADMIN — SODIUM CHLORIDE SCH MLS/HR: 900 INJECTION, SOLUTION INTRAVENOUS at 21:16

## 2018-03-24 RX ADMIN — EXTENDED PHENYTOIN SODIUM SCH MG: 100 CAPSULE ORAL at 20:05

## 2018-03-24 RX ADMIN — TAMSULOSIN HYDROCHLORIDE SCH MG: 0.4 CAPSULE ORAL at 20:03

## 2018-03-24 RX ADMIN — OXYCODONE HYDROCHLORIDE AND ACETAMINOPHEN PRN TAB: 5; 325 TABLET ORAL at 16:19

## 2018-03-24 RX ADMIN — GABAPENTIN SCH MG: 300 CAPSULE ORAL at 13:14

## 2018-03-24 NOTE — GASTROINTESTINAL CONSULTATION
Gastrointestinal Consultation


Date of Consultation:  Mar 24, 2018


Attending Physician:  Dr. Burnham


Consulting Physician:  Dr. Hensley


Reason for Consultation:  elevated LFTs


History of Present Illness


Patient is a 52 year old male with DM and a h/o CVA recently found to have 

diffuse retroperitoneal adenopathy and ultimately underwent open bx on 3/20.  

The pathology is consistent with Hodgkin's lymphoma. He is being followed by 

oncology.  We are asked to see him for elevated liver enzymes.  In January his 

LFTs were normal except for alkaline phosphatase of 226.  In mid February his 

LFTs appear to have started to increase with AST of 93,  and AP of 186.  

Following his biopsy on 3/20, apparently he had a fever the next day and labs 

were checked.  Noted to have elevated liver enzymes.  





      3/21      3/22      3/23      3/24


AST    411      280      318      171


ALT   331      239      262      205


AP   385      292      294      272


TB   0.6      0.6      0.7      0.7





His INR was elevated but responded to vit K and has normalized.  Elevated 

ferritin with low T sat likely acute phase reactant.





No personal of fmaily history of liver disease.  US imaging suggestive of





Past Medical/Surgical History


as note din HPI


Past Medical History:


as noted in HPI


Past Surgical History:


open biopsy





Family History





Patient reports no known family medical history.





Social History


Smoking Status:  Former Smoker


Alcohol Use:  none


Marital Status:  single


Housing Status:  lives with family





Allergies


Coded Allergies:  


     No Known Allergies (Unverified , 3/20/18)





Current Medications





Home Meds and Scripts








 Medications  Dose


 Route/Sig


 Max Daily Dose Days Date Category Dose


Instructions


 


 Roxicodone Ir


  (Oxycodone HCl) 5


 Mg Tab  10 Mg


 PO Q4H PRN


    3/21/18 Rx 


 


 Dilantin


  (Phenytoin


 Sodium) 100 Mg Cap  300 Mg


 PO QPM


   30 3/16/18 Reported 


 


 [Magnesium]    500 Mg


 PO QPM


    3/16/18 Reported 


 


 Airborne


  (Multiple


 Vitamins W/


 Minerals) 1 Tab


 Tab  1 Tab


 PO QAM


    3/16/18 Reported 


 


 [Timolol 0.25%]    1 Drop


 OPR QAM


    3/16/18 Reported 


 


 Tamsulosin HCl


 0.4 Mg Cap  0.4 Mg


 PO HS


    3/16/18 Reported 


 


 Neurontin


  (Gabapentin) 100


 Mg Cap  300 Mg


 PO TID


    3/16/18 Reported 


 


 Zoloft


  (Sertraline Hcl)


 50 Mg Tab  50 Mg


 PO HS


    4/27/13 Reported 


 


 Plavix


  (Clopidogrel


 Bisulfate) 75 Mg


 Tab  75 Mg


 PO QAM


    4/27/13 Reported  WILL FOLLOW INSTRUCTIONS FROM SURGEON


 


 Aspirin Dr


  (Aspirin) 81 Mg


 Tab  81 Mg


 PO QAM


    4/27/13 Reported  WILL FOLLOW SURGEON INSTRUCTION


 


 Protonix


  (Pantoprazole


 Sodium) 40 Mg Tab  40 Mg


 PO QAM


    4/27/13 Reported 


 


 Glyburide 1.25 Mg


 Tab  1.25 Mg


 PO QAM


    4/27/13 Reported  TAKE THIS MEDICATION WITH BREAKFAST.


 


 Glucophage


  (Metformin Hcl)


 500 Mg Tab  500 Mg


 PO TIDM


    7/21/12 Reported 











Review of Systems


12 systems reviewed and negative except as noted





Physical Exam











  Date Time  Temp Pulse Resp B/P (MAP) Pulse Ox O2 Delivery O2 Flow Rate FiO2


 


3/24/18 10:04      Room Air  


 


3/24/18 07:11 37.2 102 15 110/78 (89) 98 Room Air  


 


3/24/18 06:00  102  131/84 (100)    





  105  123/83 (96)    





  116  116/69 (85)    


 


3/23/18 23:40      Room Air  


 


3/23/18 22:53 37.3 105 18 118/75 (89) 96 Room Air  


 


3/23/18 22:14    137/88 (104)    


 


3/23/18 21:30    138/85 (102)    


 


3/23/18 17:17 36.7 91 20 138/88 (105) 96 Room Air  


 


3/23/18 16:38 36.8 96 16 130/84 (99) 97 Room Air  


 


3/23/18 16:15 36.5 94 18 133/83 (100) 96 Room Air  


 


3/23/18 15:30      Room Air  


 


3/23/18 15:24 36.6 105 18 125/83 (97) 95 Room Air  


 


3/23/18 14:00  111  127/84 (98)    


 


3/23/18 14:00  124  128/79 (95)    





  114  132/78 (96)    








General Appearance:  WD/WN


Eyes:  normal inspection, PERRL


ENT:  normal ENT inspection, hearing grossly normal, pharynx normal


Neck:  supple, no JVD


Respiratory/Chest:  chest non-tender, lungs clear, normal breath sounds


Cardiovascular:  + tachycardia


Abdomen:  normal bowel sounds, non tender, soft


Extremities:  normal range of motion, non-tender


Neurologic/Psych:  CNs II-XII nml as tested, no motor/sensory deficits, alert, 

normal mood/affect, oriented x 3


Skin:  normal color, no jaundice





Laboratory Results





Last 24 Hours








Test


  3/23/18


12:11 3/23/18


16:40 3/23/18


17:09 3/23/18


20:35


 


Bedside Glucose 281 mg/dl   171 mg/dl  176 mg/dl 


 


Ammonia  < 10.0 umol/L   


 


Test


  3/24/18


08:03 3/24/18


08:16 


  


 


 


Bedside Glucose 127 mg/dl    


 


White Blood Count  4.67 K/uL   


 


Red Blood Count  4.18 M/uL   


 


Hemoglobin  11.2 g/dL   


 


Hematocrit  34.3 %   


 


Mean Corpuscular Volume  82.1 fL   


 


Mean Corpuscular Hemoglobin  26.8 pg   


 


Mean Corpuscular Hemoglobin


Concent 


  32.7 g/dl 


  


  


 


 


RDW Standard Deviation  44.5 fL   


 


RDW Coefficient of Variation  14.9 %   


 


Platelet Count  252 K/uL   


 


Mean Platelet Volume  9.3 fL   


 


Prothrombin Time  11.4 SECONDS   


 


Prothromb Time International


Ratio 


  1.1 


  


  


 


 


Sodium Level  131 mmol/L   


 


Potassium Level  3.8 mmol/L   


 


Chloride Level  99 mmol/L   


 


Carbon Dioxide Level  25 mmol/L   


 


Anion Gap  7.0 mmol/L   


 


Blood Urea Nitrogen  4 mg/dl   


 


Creatinine  0.28 mg/dl   


 


Est Creatinine Clear Calc


Drug Dose 


  301.9 ml/min 


  


  


 


 


Estimated GFR (


American) 


  > 150.0 


  


  


 


 


Estimated GFR (Non-


American 


  > 150.0 


  


  


 


 


BUN/Creatinine Ratio  14.1   


 


Random Glucose  138 mg/dl   


 


Calcium Level  8.1 mg/dl   


 


Total Bilirubin  0.7 mg/dl   


 


Aspartate Amino Transf


(AST/SGOT) 


  171 U/L 


  


  


 


 


Alanine Aminotransferase


(ALT/SGPT) 


  205 U/L 


  


  


 


 


Alkaline Phosphatase  272 U/L   


 


Total Protein  5.2 gm/dl   


 


Albumin  1.5 gm/dl   


 


Globulin  3.7 gm/dl   


 


Albumin/Globulin Ratio  0.4   











Impression


Patient is a 52 year old male with newly diagnosed diffuse Hodgkins lymphoma 

noted to have elevated liver enzymes consistent with a hepatocellular process.  

Suspect this is related to the lymphoma.  Numbers improving.  Agree with the 

additional testing which has already been ordered.  Monitor LFTs after 

discharge.





Plan


as above.

## 2018-03-24 NOTE — DIAGNOSTIC IMAGING REPORT
CHEST ONE VIEW PORTABLE



CLINICAL HISTORY: fever, eval for pneumonia; eval for any developing CHF dyspnea



COMPARISON STUDY:  3/20/2018 mild stable cardiomegaly.



FINDINGS: Central catheter remains in the right atrium. No evidence

pneumothorax. Potential minimal parenchymal infiltrate right base. Diaphragms

smooth. Contrast costophrenic angles are sharp. 



IMPRESSION:  Potential minimal parenchymal infiltrate right base. 











The above report was generated using voice recognition software.  It may contain

grammatical, syntax or spelling errors.









Electronically signed by:  Ben Rojas M.D.

3/24/2018 5:30 PM



Dictated Date/Time:  3/24/2018 5:29 PM

## 2018-03-24 NOTE — HOSPITALIST PROGRESS NOTE
Hospitalist Progress Note


Date of Service


Mar 24, 2018.


 (Sarah Mcclendon .JOSE EDUARDO)





Subjective


Pt evaluation today including:  conversation w/ patient, physical exam, chart 

review, lab review, review of studies, review of inpatient medication list


Mr. Beckham reports significant 7/10 pain in his abdomen and that he is "pretty 

miserable".  His heart rate continues to be tachycardic. He is eating and 

drinking well and had a bowel movement this morning.  





ROS


Constitutional: no chills, aches, sweats or fever


Respiratory: no sob,cough, sputum, or wheezing


Cardiac: no chest pain, palpitations, edema, orthopnea or lightheadedness


GI: no abdominal pain, nausea, vomiting, diarrhea or constipation


: no dysuria or hesitancy


Extremities: no joint pain or weakness


Skin: no rash





All other systems reviewed and negative


 (Sarah Mcclendon CRNP)





Medications





Medications Administered








 Medications


  (Trade)  Dose


 Ordered  Sig/Dominic


 Route  Start Time


 Stop Time Status Last Admin


Dose Admin


 


 Lactated Ringer's  1,000 ml @ 


 15 mls/hr  Q24H


 IV  3/20/18 06:00


 3/20/18 14:09 DC 3/20/18 08:38


15 MLS/HR


 


 Fentanyl Citrate


  (Fentanyl Inj)  50 mcg  Q5M  PRN


 IV  3/20/18 09:00


 3/20/18 16:00 DC 3/20/18 14:08


50 MCG


 


 Lidocaine HCl


  (Xylocaine 1%


 Inj (Local))  40 ml  STK-MED ONCE


 .ROUTE  3/20/18 09:57


 3/20/18 09:58 DC 3/20/18 12:59


20 ML


 


 Heparin Sodium


  (Porcine)


  (Heparin 100


 Unit/ml 5ml Flush)  25 ml  STK-MED ONCE


 .ROUTE  3/20/18 09:57


 3/20/18 09:58 DC 3/20/18 12:58


5 ML


 


 Bacitracin


  (Bacitracin Inj)  50,000 units  STK-MED ONCE


 .ROUTE  3/20/18 09:57


 3/20/18 09:58 DC 3/20/18 11:07


50,000 UNITS


 


 Miscellaneous


  (Surgicel Absorb


 Hemostat 2in X


 14in)  1 ea  ONE  ONCE


 TOP  3/20/18 11:42


 3/20/18 11:47 DC 3/20/18 11:47


1 EA


 


 Lactated Ringer's  1,000 ml @ 


 125 mls/hr  Q8H


 IV  3/20/18 15:15


 3/21/18 09:47 DC 3/21/18 05:50


75 MLS/HR


 


 Morphine Sulfate


  (MoRPHine


 SULFATE INJ)  4 mg  Q1H  PRN


 IV  3/20/18 13:45


 4/3/18 13:44  3/23/18 23:46


4 MG


 


 Insulin Aspart


  (novoLOG ASPART)  **SLIDING


 SCALE**


 **If C...  ACHS


 SC  3/20/18 16:00


 4/19/18 15:59  3/24/18 09:44


3 UNITS


 


 Aspirin


  (Ecotrin Tab)  81 mg  QAM


 PO  3/21/18 09:00


 4/20/18 08:59  3/24/18 09:40


81 MG


 


 Gabapentin


  (Neurontin Cap)  300 mg  TID


 PO  3/20/18 21:00


 4/19/18 20:59  3/24/18 09:39


300 MG


 


 Oxycodone HCl


  (Roxicodone


 Immediate Rel Tab)  10 mg  Q4H  PRN


 PO  3/20/18 13:45


 4/3/18 13:44  3/24/18 05:22


10 MG


 


 Pantoprazole


 Sodium


  (Protonix Tab)  40 mg  QAM


 PO  3/21/18 09:00


 4/20/18 08:59  3/24/18 09:39


40 MG


 


 Phenytoin Sodium


  (Dilantin Er Cap)  300 mg  QPM


 PO  3/20/18 21:00


 4/19/18 20:59  3/23/18 20:19


300 MG


 


 Sertraline HCl


  (Zoloft Tab)  50 mg  HS


 PO  3/20/18 21:00


 4/19/18 20:59  3/23/18 20:18


50 MG


 


 Tamsulosin HCl


  (Flomax Cap)  0.4 mg  HS


 PO  3/20/18 21:00


 4/19/18 20:59  3/23/18 20:20


0.4 MG


 


 Timolol Maleate


  (Timoptic 0.25%


 Oph Soln)  1 drops  QAM


 OPR  3/21/18 09:00


 4/20/18 08:59  3/24/18 09:40


1 DROPS


 


 Pneumococcal


 Polysaccharide


 Vaccine


  (Pneumovax-23


 Inj)  25 mcg  ONCE ONCE


 IM.  3/20/18 15:15


 3/20/18 15:16 DC 3/21/18 00:53


25 MCG


 


 Potassium


 Chloride/Sodium


 Chloride  1,000 ml @ 


 75 mls/hr  [id number]


 IV  3/21/18 10:15


 3/22/18 09:36 DC 3/22/18 02:24


125 MLS/HR


 


 Oxycodone/


 Acetaminophen


  (Percocet


 5-325mg Tab)  1 tab  Q4H  PRN


 PO  3/21/18 11:00


 3/24/18 12:11 DC 3/23/18 13:23


1 TAB


 


 Lidocaine HCl


  (Xylocaine Jelly


 2%)  1 APPLICATION


 PER URETHRA


 FOR URIN...  PRN  PRN


 EXT  3/21/18 15:30


 4/20/18 15:29  3/21/18 15:57


1 ML


 


 Calcium Carbonate


  (Tums Chew Tab)  500 mg  Q6H  PRN


 PO  3/21/18 18:30


 4/20/18 18:29  3/21/18 21:20


500 MG


 


 Senna


  (Senokot Tab)  17.2 mg  QAM


 PO  3/22/18 09:00


 4/21/18 08:59  3/24/18 09:40


17.2 MG


 


 Senna


  (Senokot Tab)  17.2 mg  1930  ONCE


 PO  3/21/18 19:30


 3/21/18 19:31 DC 3/21/18 21:18


17.2 MG


 


 Polyethylene


  (Miralax Powder


 Packet)  17 gm  DAILY  PRN


 PO  3/21/18 19:30


 4/20/18 19:29  3/23/18 15:37


17 GM


 


 Heparin Sodium


  (Porcine)


  (Heparin Sq 5000


 Unit/0.5ml)  5,000 unit  Q12


 SQ  3/22/18 12:30


 4/21/18 12:29  3/24/18 09:45


5,000 UNIT


 


 Sodium Chloride  1,000 ml @ 


 75 mls/hr  [id number]


 IV  3/22/18 09:45


 4/21/18 09:44  3/23/18 23:37


75 MLS/HR


 


 Phytonadione 10


 mg/Sodium Chloride  51 ml @ 


 102 mls/hr  ONE  ONCE


 IV  3/23/18 16:00


 3/23/18 16:29 DC 3/23/18 16:14


102 MLS/HR


 


 Insulin Glargine


  (Lantus Solostar


 Pen)  10 units  HS


 SC  3/23/18 21:00


 4/22/18 20:59  3/23/18 21:41


10 UNITS








 (Sarah Mcclendon CRNP)





Objective


Vital Signs











  Date Time  Temp Pulse Resp B/P (MAP) Pulse Ox O2 Delivery O2 Flow Rate FiO2


 


3/24/18 10:04      Room Air  


 


3/24/18 07:11 37.2 102 15 110/78 (89) 98 Room Air  


 


3/24/18 06:00  102  131/84 (100)    





  105  123/83 (96)    





  116  116/69 (85)    


 


3/23/18 23:40      Room Air  


 


3/23/18 22:53 37.3 105 18 118/75 (89) 96 Room Air  


 


3/23/18 22:14    137/88 (104)    


 


3/23/18 21:30    138/85 (102)    


 


3/23/18 17:17 36.7 91 20 138/88 (105) 96 Room Air  


 


3/23/18 16:38 36.8 96 16 130/84 (99) 97 Room Air  


 


3/23/18 16:15 36.5 94 18 133/83 (100) 96 Room Air  


 


3/23/18 15:30      Room Air  


 


3/23/18 15:24 36.6 105 18 125/83 (97) 95 Room Air  


 


3/23/18 14:00  111  127/84 (98)    


 


3/23/18 14:00  124  128/79 (95)    





  114  132/78 (96)    








 (Sarah Mcclendon CRNP)





Physical Exam


Notes:


General: no distress


Eyes: normal inspection, PERLL


Respiratory: chest non tender, clear to auscultation, normal breath sounds, no 

respiratory distress, no accessory muscle use


Cardiac: regular rate and rhythm, no rub or gallop, no murmur, no edema, no jvd


GI/: active bowel sounds, tender to palpation, soft, mild distention 


Extremities: normal range of motion, normal strength, non tender 


Neuro/Psych: alert and oriented x 3, flat affect


Skin: normal color, dry, incisions well approximated, staples intact, no 

drainage or erythema


 (Sarah Mcclendon CRNP)





Laboratory Results





Last 24 Hours








Test


  3/23/18


16:40 3/23/18


17:09 3/23/18


20:35 3/24/18


08:03


 


Ammonia < 10.0 umol/L    


 


Bedside Glucose  171 mg/dl  176 mg/dl  127 mg/dl 


 


Test


  3/24/18


08:16 


  


  


 


 


White Blood Count 4.67 K/uL    


 


Red Blood Count 4.18 M/uL    


 


Hemoglobin 11.2 g/dL    


 


Hematocrit 34.3 %    


 


Mean Corpuscular Volume 82.1 fL    


 


Mean Corpuscular Hemoglobin 26.8 pg    


 


Mean Corpuscular Hemoglobin


Concent 32.7 g/dl 


  


  


  


 


 


RDW Standard Deviation 44.5 fL    


 


RDW Coefficient of Variation 14.9 %    


 


Platelet Count 252 K/uL    


 


Mean Platelet Volume 9.3 fL    


 


Prothrombin Time 11.4 SECONDS    


 


Prothromb Time International


Ratio 1.1 


  


  


  


 


 


Sodium Level 131 mmol/L    


 


Potassium Level 3.8 mmol/L    


 


Chloride Level 99 mmol/L    


 


Carbon Dioxide Level 25 mmol/L    


 


Anion Gap 7.0 mmol/L    


 


Blood Urea Nitrogen 4 mg/dl    


 


Creatinine 0.28 mg/dl    


 


Est Creatinine Clear Calc


Drug Dose 301.9 ml/min 


  


  


  


 


 


Estimated GFR (


American) > 150.0 


  


  


  


 


 


Estimated GFR (Non-


American > 150.0 


  


  


  


 


 


BUN/Creatinine Ratio 14.1    


 


Random Glucose 138 mg/dl    


 


Calcium Level 8.1 mg/dl    


 


Total Bilirubin 0.7 mg/dl    


 


Aspartate Amino Transf


(AST/SGOT) 171 U/L 


  


  


  


 


 


Alanine Aminotransferase


(ALT/SGPT) 205 U/L 


  


  


  


 


 


Alkaline Phosphatase 272 U/L    


 


Total Protein 5.2 gm/dl    


 


Albumin 1.5 gm/dl    


 


Globulin 3.7 gm/dl    


 


Albumin/Globulin Ratio 0.4    








 (Sarah Mcclendon, JOSE EDUARDO)





Assessment and Plan


Mr. Beckham is a 53 y/o male with PMHx of T2DM with Peripheral Neuropathy and 

CVA with L Residual Deficits who is S/P Open Retroperitoneal Mass Bx, 

Excisional Bx of Soft Tissue of L Lumbar Mass, and A Port in L Jugular. 

Hospitalist consulted for tachycardia and weakness.





Sinus Tachycardia: 


- EKG reviewed that shows sinus tachy without ischemic changes or other 

worrisome changes - tachycardia seems likely due to pain at this point


- Reporting continued abdominal pain even with pain medication - will increase 

oxycodone to 1-2 tablets prn 


- hyponatremic but otherwise electrolytes wnl


- CT negative for PE





Hyponatremia


- likely due to dehydration - will hydrate with NSS at 125 ml/hr - Na is 

trending up to 131 today - continue NSS @ 75 ml/hr


- Serum osm low at 268, urine osm high at 719, random urine sodium at 38, 

random cortisol and TSH wnl 





Elevated LFTs


- appears chronic - trending down 


- INR elevated at 1.9 on admission - given IV vit K 3/23- 1.1 today - possible 

due to poor po intake?


- hepatitis panel negative, CHAPIS pending


- consult GI





Anemia


- serum iron low, ferritin high - likely acute phase reactant





Urinary Retention with BPH:


- KUB - SBO vs ileus - no nausea or vomiting - no intervention at this time


- discontinue bautista


-  UC showed no growth


- Continue Flomax 0.4 mg HS





T2DM with Peripheral Neuropathy:


- again tightened SS as sugars continue to be elevated, bsgs AC& HS 


- continue Gabapentin 300 mg TID





CVA with L Residual Deficits/history of seizure


- ASA 81 mg daily; Will defer Plavix 75 mg daily to primary team when they 

would like to continued


- Last seizure 2012 - serum Dilantin a bit low however as seizure history is 

remote, will leave dose as it is and patient can follow up outpatient - 

continue Dilantin 300 mg daily and Zoloft 50 mg HS








Disposition:


- likely can go home in the morning if continuing to do well 


 (Sarah Mcclendon ., JOSE EDUARDO)


Attending Attestation - 


Pt seen/examined, chart reviewed, care plan d/w JOSE EDUARDO Mcclendon.


I agree w/ the law components of her documentation.





Pt c/o abd pain - no worse than yesterday.


Bautista removed just before my visit. 


Denies dyspnea.


No cough. 





2 stools per the record


Tm 37.9


tachy


mild increase RR





gen - looks the same as yesterday


mouth - MMM


neck - no JVD


heart - tachy, s1, s2


lungs - CTA b/l, mild retraction, mild tachypnea


abd - softly distended, BS+, mild tenderness over incisions


ext - no edema


neuro - left sided hemiplegia 





labs - 


Na 131


INR 1.1


LFTs modestly improved today





A/P:


1.  low-grade fever - check u/a, urine cx, blood cx's, cxr.  Hold off on 

antibiotics unless source of infection is seen.  This could be fever due to 

Hodgkin's or could be post-op fever.  


2.  mild tachypnea - could be 2nd to fever, but checking cxr - r/o pneumonia, r/

o CHF. 


3.  coagulopathy - likely vit K def - resolved.


4.  abnormal LFTs - GI consult appreciated - thought to be due to lymphoma, but 

awaiting rest of w/u. 


5.  hyponatremia - improving; is net positive 7.5 liters to date; stop fluids, 

repeat labs am. 


6.  newly dx Hodgkin's lymphoma - to have outpatient chemo. 


7.  severe hypoalbuminemia - due to severe protein calorie malnutrition. 


8.  FEN - would stop fluids at this time; repeat labs am.





PT, OT evals to ensure safe return home





Kirk White MD


 (Kirk White MD)

## 2018-03-24 NOTE — SURGERY PROGRESS NOTE
Surgery Progress Note


Date of Service


Mar 24, 2018.





Subjective


Post OP Day:  3


+ feeling well, + complaints (some back pain but better), + flatus, + diet (

fulls), No bowel movement, No nausea, No vomiting





Objective


Vital Signs:











  Date Time  Temp Pulse Resp B/P (MAP) Pulse Ox O2 Delivery O2 Flow Rate FiO2


 


3/24/18 07:11 37.2 102 15 110/78 (89) 98 Room Air  


 


3/24/18 06:00  102  131/84 (100)    





  105  123/83 (96)    





  116  116/69 (85)    


 


3/23/18 23:40      Room Air  


 


3/23/18 22:53 37.3 105 18 118/75 (89) 96 Room Air  


 


3/23/18 22:14    137/88 (104)    


 


3/23/18 21:30    138/85 (102)    


 


3/23/18 17:17 36.7 91 20 138/88 (105) 96 Room Air  


 


3/23/18 16:38 36.8 96 16 130/84 (99) 97 Room Air  


 


3/23/18 16:15 36.5 94 18 133/83 (100) 96 Room Air  


 


3/23/18 15:30      Room Air  


 


3/23/18 15:24 36.6 105 18 125/83 (97) 95 Room Air  


 


3/23/18 14:00  111  127/84 (98)    


 


3/23/18 14:00  124  128/79 (95)    





  114  132/78 (96)    


 


3/23/18 09:57    116/71 (86)    





    119/61 (80)    





    108/73 (85)    








General Appearance:  WD/WN, no apparent distress


Head:  normocephalic, atraumatic


Neck:  supple


Respiratory/Chest:  lungs clear


Cardiovascular:  regular rate, rhythm


Abdomen:  normal bowel sounds, soft, + distended (mild), + tenderness (

incisional)


Incision(s):  clean, dry, intact


Extremities:  non-tender, no pedal edema


Laboratory Results:





Results Past 24 Hours








Test


  3/23/18


12:11 3/23/18


16:40 3/23/18


17:09 3/23/18


20:35 Range/Units


 


 


Bedside Glucose 281  171 176 70-99  mg/dl


 


Ammonia  < 10.0   11-32  umol/L


 


Test


  3/24/18


08:03 3/24/18


08:16 


  


  Range/Units


 


 


Bedside Glucose 127    70-99  mg/dl











Assessment & Plan


Hodgkin's lymphoma


-plans to treat with chemo as outpatient


-bautista trial per medicine today


-taking po well


-pain better


-await GI evaluation of LFTs


-possibly home in AM if does well

## 2018-03-25 VITALS
OXYGEN SATURATION: 90 % | TEMPERATURE: 97.7 F | DIASTOLIC BLOOD PRESSURE: 78 MMHG | SYSTOLIC BLOOD PRESSURE: 117 MMHG | HEART RATE: 115 BPM

## 2018-03-25 VITALS — DIASTOLIC BLOOD PRESSURE: 69 MMHG | SYSTOLIC BLOOD PRESSURE: 110 MMHG | HEART RATE: 102 BPM

## 2018-03-25 VITALS
OXYGEN SATURATION: 95 % | DIASTOLIC BLOOD PRESSURE: 74 MMHG | TEMPERATURE: 99.68 F | HEART RATE: 101 BPM | SYSTOLIC BLOOD PRESSURE: 112 MMHG

## 2018-03-25 VITALS
SYSTOLIC BLOOD PRESSURE: 138 MMHG | DIASTOLIC BLOOD PRESSURE: 89 MMHG | OXYGEN SATURATION: 97 % | HEART RATE: 102 BPM | TEMPERATURE: 98.06 F

## 2018-03-25 LAB
ALBUMIN SERPL-MCNC: 1.4 GM/DL (ref 3.4–5)
ALP SERPL-CCNC: 253 U/L (ref 45–117)
ALT SERPL-CCNC: 157 U/L (ref 12–78)
AST SERPL-CCNC: 93 U/L (ref 15–37)
BUN SERPL-MCNC: 4 MG/DL (ref 7–18)
CALCIUM SERPL-MCNC: 7.5 MG/DL (ref 8.5–10.1)
CO2 SERPL-SCNC: 29 MMOL/L (ref 21–32)
CREAT SERPL-MCNC: 0.32 MG/DL (ref 0.6–1.4)
EOSINOPHIL NFR BLD AUTO: 252 K/UL (ref 130–400)
GLUCOSE SERPL-MCNC: 152 MG/DL (ref 70–99)
HCT VFR BLD CALC: 34.9 % (ref 42–52)
HGB BLD-MCNC: 11.2 G/DL (ref 14–18)
MCH RBC QN AUTO: 26.4 PG (ref 25–34)
MCHC RBC AUTO-ENTMCNC: 32.1 G/DL (ref 32–36)
MCV RBC AUTO: 82.1 FL (ref 80–100)
PMV BLD AUTO: 9.7 FL (ref 7.4–10.4)
POTASSIUM SERPL-SCNC: 3.6 MMOL/L (ref 3.5–5.1)
PROT SERPL-MCNC: 5.1 GM/DL (ref 6.4–8.2)
RED CELL DISTRIBUTION WIDTH CV: 14.8 % (ref 11.5–14.5)
RED CELL DISTRIBUTION WIDTH SD: 44.6 FL (ref 36.4–46.3)
SODIUM SERPL-SCNC: 133 MMOL/L (ref 136–145)
WBC # BLD AUTO: 3.92 K/UL (ref 4.8–10.8)

## 2018-03-25 RX ADMIN — TIMOLOL MALEATE SCH DROPS: 5 SOLUTION OPHTHALMIC at 09:21

## 2018-03-25 RX ADMIN — SODIUM CHLORIDE SCH MLS/HR: 900 INJECTION, SOLUTION INTRAVENOUS at 10:41

## 2018-03-25 RX ADMIN — INSULIN ASPART SCH UNITS: 100 INJECTION, SOLUTION INTRAVENOUS; SUBCUTANEOUS at 20:36

## 2018-03-25 RX ADMIN — GABAPENTIN SCH MG: 300 CAPSULE ORAL at 09:21

## 2018-03-25 RX ADMIN — GABAPENTIN SCH MG: 300 CAPSULE ORAL at 13:29

## 2018-03-25 RX ADMIN — Medication SCH MG: at 09:21

## 2018-03-25 RX ADMIN — INSULIN GLARGINE SCH UNITS: 100 INJECTION, SOLUTION SUBCUTANEOUS at 20:37

## 2018-03-25 RX ADMIN — OXYCODONE HYDROCHLORIDE AND ACETAMINOPHEN PRN TAB: 5; 325 TABLET ORAL at 09:41

## 2018-03-25 RX ADMIN — INSULIN ASPART SCH UNITS: 100 INJECTION, SOLUTION INTRAVENOUS; SUBCUTANEOUS at 09:32

## 2018-03-25 RX ADMIN — Medication SCH CAN: at 17:45

## 2018-03-25 RX ADMIN — TAMSULOSIN HYDROCHLORIDE SCH MG: 0.4 CAPSULE ORAL at 20:30

## 2018-03-25 RX ADMIN — STANDARDIZED SENNA CONCENTRATE SCH MG: 8.6 TABLET ORAL at 09:21

## 2018-03-25 RX ADMIN — SODIUM CHLORIDE SCH MLS/HR: 900 INJECTION, SOLUTION INTRAVENOUS at 05:11

## 2018-03-25 RX ADMIN — INSULIN ASPART SCH UNITS: 100 INJECTION, SOLUTION INTRAVENOUS; SUBCUTANEOUS at 18:19

## 2018-03-25 RX ADMIN — OXYCODONE HYDROCHLORIDE AND ACETAMINOPHEN PRN TAB: 5; 325 TABLET ORAL at 03:00

## 2018-03-25 RX ADMIN — HEPARIN SODIUM SCH UNIT: 10000 INJECTION, SOLUTION INTRAVENOUS; SUBCUTANEOUS at 20:37

## 2018-03-25 RX ADMIN — PANTOPRAZOLE SCH MG: 40 TABLET, DELAYED RELEASE ORAL at 09:21

## 2018-03-25 RX ADMIN — INSULIN ASPART SCH UNITS: 100 INJECTION, SOLUTION INTRAVENOUS; SUBCUTANEOUS at 12:00

## 2018-03-25 RX ADMIN — SERTRALINE HYDROCHLORIDE SCH MG: 50 TABLET, FILM COATED ORAL at 20:31

## 2018-03-25 RX ADMIN — HEPARIN SODIUM SCH UNIT: 10000 INJECTION, SOLUTION INTRAVENOUS; SUBCUTANEOUS at 09:32

## 2018-03-25 RX ADMIN — EXTENDED PHENYTOIN SODIUM SCH MG: 100 CAPSULE ORAL at 20:30

## 2018-03-25 RX ADMIN — OXYCODONE HYDROCHLORIDE AND ACETAMINOPHEN PRN TAB: 5; 325 TABLET ORAL at 20:30

## 2018-03-25 RX ADMIN — GABAPENTIN SCH MG: 300 CAPSULE ORAL at 20:31

## 2018-03-25 RX ADMIN — OXYCODONE HYDROCHLORIDE AND ACETAMINOPHEN PRN TAB: 5; 325 TABLET ORAL at 15:31

## 2018-03-25 NOTE — HOSPITALIST PROGRESS NOTE
Hospitalist Progress Note


Date of Service


Mar 25, 2018.


 (Sarah Mcclendon ., JOSE EDUARDO)





Subjective


Pt evaluation today including:  conversation w/ patient, physical exam, chart 

review, lab review, review of inpatient medication list


Voiding:  no voiding problems


Mr. Beckham became diaphoretic and sob last night.  He is better this morning 

though his tachycardia persists.  He also complains of abdominal pain as it's 

been about 6 hours since his last medication dose. Per nursing, he had a large 

bowel movement this morning. 





ROS


Constitutional: no chills, aches, sweats or fever


Respiratory: no sob,cough, sputum, or wheezing


Cardiac: no chest pain, palpitations, edema, orthopnea or lightheadedness


GI: see HPI


: no dysuria or hesitancy


Extremities: no joint pain or weakness


Skin: no rash





All other systems reviewed and negative


 (Sarah Mcclendon .JOSE EDUARDO)





Medications





Medications Administered








 Medications


  (Trade)  Dose


 Ordered  Sig/Dominic


 Route  Start Time


 Stop Time Status Last Admin


Dose Admin


 


 Lactated Ringer's  1,000 ml @ 


 15 mls/hr  Q24H


 IV  3/20/18 06:00


 3/20/18 14:09 DC 3/20/18 08:38


15 MLS/HR


 


 Fentanyl Citrate


  (Fentanyl Inj)  50 mcg  Q5M  PRN


 IV  3/20/18 09:00


 3/20/18 16:00 DC 3/20/18 14:08


50 MCG


 


 Lidocaine HCl


  (Xylocaine 1%


 Inj (Local))  40 ml  STK-MED ONCE


 .ROUTE  3/20/18 09:57


 3/20/18 09:58 DC 3/20/18 12:59


20 ML


 


 Heparin Sodium


  (Porcine)


  (Heparin 100


 Unit/ml 5ml Flush)  25 ml  STK-MED ONCE


 .ROUTE  3/20/18 09:57


 3/20/18 09:58 DC 3/20/18 12:58


5 ML


 


 Bacitracin


  (Bacitracin Inj)  50,000 units  STK-MED ONCE


 .ROUTE  3/20/18 09:57


 3/20/18 09:58 DC 3/20/18 11:07


50,000 UNITS


 


 Miscellaneous


  (Surgicel Absorb


 Hemostat 2in X


 14in)  1 ea  ONE  ONCE


 TOP  3/20/18 11:42


 3/20/18 11:47 DC 3/20/18 11:47


1 EA


 


 Lactated Ringer's  1,000 ml @ 


 125 mls/hr  Q8H


 IV  3/20/18 15:15


 3/21/18 09:47 DC 3/21/18 05:50


75 MLS/HR


 


 Morphine Sulfate


  (MoRPHine


 SULFATE INJ)  4 mg  Q1H  PRN


 IV  3/20/18 13:45


 4/3/18 13:44  3/23/18 23:46


4 MG


 


 Insulin Aspart


  (novoLOG ASPART)  **SLIDING


 SCALE**


 **If C...  ACHS


 SC  3/20/18 16:00


 4/19/18 15:59  3/25/18 09:32


3 UNITS


 


 Aspirin


  (Ecotrin Tab)  81 mg  QAM


 PO  3/21/18 09:00


 4/20/18 08:59  3/25/18 09:21


81 MG


 


 Gabapentin


  (Neurontin Cap)  300 mg  TID


 PO  3/20/18 21:00


 4/19/18 20:59  3/25/18 09:21


300 MG


 


 Oxycodone HCl


  (Roxicodone


 Immediate Rel Tab)  10 mg  Q4H  PRN


 PO  3/20/18 13:45


 4/3/18 13:44  3/24/18 05:22


10 MG


 


 Pantoprazole


 Sodium


  (Protonix Tab)  40 mg  QAM


 PO  3/21/18 09:00


 4/20/18 08:59  3/25/18 09:21


40 MG


 


 Phenytoin Sodium


  (Dilantin Er Cap)  300 mg  QPM


 PO  3/20/18 21:00


 4/19/18 20:59  3/23/18 20:19


300 MG


 


 Sertraline HCl


  (Zoloft Tab)  50 mg  HS


 PO  3/20/18 21:00


 4/19/18 20:59  3/24/18 20:03


50 MG


 


 Tamsulosin HCl


  (Flomax Cap)  0.4 mg  HS


 PO  3/20/18 21:00


 4/19/18 20:59  3/24/18 20:03


0.4 MG


 


 Timolol Maleate


  (Timoptic 0.25%


 Oph Soln)  1 drops  QAM


 OPR  3/21/18 09:00


 4/20/18 08:59  3/25/18 09:21


1 DROPS


 


 Pneumococcal


 Polysaccharide


 Vaccine


  (Pneumovax-23


 Inj)  25 mcg  ONCE ONCE


 IM.  3/20/18 15:15


 3/20/18 15:16 DC 3/21/18 00:53


25 MCG


 


 Potassium


 Chloride/Sodium


 Chloride  1,000 ml @ 


 75 mls/hr  [id number]


 IV  3/21/18 10:15


 3/22/18 09:36 DC 3/22/18 02:24


125 MLS/HR


 


 Oxycodone/


 Acetaminophen


  (Percocet


 5-325mg Tab)  1 tab  Q4H  PRN


 PO  3/21/18 11:00


 3/24/18 12:11 DC 3/23/18 13:23


1 TAB


 


 Lidocaine HCl


  (Xylocaine Jelly


 2%)  1 APPLICATION


 PER URETHRA


 FOR URIN...  PRN  PRN


 EXT  3/21/18 15:30


 4/20/18 15:29  3/21/18 15:57


1 ML


 


 Calcium Carbonate


  (Tums Chew Tab)  500 mg  Q6H  PRN


 PO  3/21/18 18:30


 4/20/18 18:29  3/21/18 21:20


500 MG


 


 Senna


  (Senokot Tab)  17.2 mg  QAM


 PO  3/22/18 09:00


 4/21/18 08:59  3/25/18 09:21


17.2 MG


 


 Senna


  (Senokot Tab)  17.2 mg  1930  ONCE


 PO  3/21/18 19:30


 3/21/18 19:31 DC 3/21/18 21:18


17.2 MG


 


 Polyethylene


  (Miralax Powder


 Packet)  17 gm  DAILY  PRN


 PO  3/21/18 19:30


 4/20/18 19:29  3/23/18 15:37


17 GM


 


 Heparin Sodium


  (Porcine)


  (Heparin Sq 5000


 Unit/0.5ml)  5,000 unit  Q12


 SQ  3/22/18 12:30


 4/21/18 12:29  3/25/18 09:32


5,000 UNIT


 


 Sodium Chloride  1,000 ml @ 


 75 mls/hr  [id number]


 IV  3/22/18 09:45


 3/24/18 16:54 DC 3/24/18 13:19


75 MLS/HR


 


 Phytonadione 10


 mg/Sodium Chloride  51 ml @ 


 102 mls/hr  ONE  ONCE


 IV  3/23/18 16:00


 3/23/18 16:29 DC 3/23/18 16:14


102 MLS/HR


 


 Insulin Glargine


  (Lantus Solostar


 Pen)  10 units  HS


 SC  3/23/18 21:00


 4/22/18 20:59  3/24/18 21:13


10 UNITS


 


 Oxycodone/


 Acetaminophen


  (Percocet


 5-325mg Tab)   @   Q4H  PRN


 PO  3/24/18 12:15


 4/4/18 10:59  3/25/18 09:41


1 TAB


 


 Sodium Chloride  1,000 ml @ 


 80 mls/hr  [id number]


 IV  3/24/18 22:00


 4/23/18 21:59  3/25/18 05:11


80 MLS/HR








 (Sarah Mcclendon CRNP)





Objective


Vital Signs











  Date Time  Temp Pulse Resp B/P (MAP) Pulse Ox O2 Delivery O2 Flow Rate FiO2


 


3/25/18 08:10 36.7 102 17 138/89 (105) 97 Room Air  


 


3/25/18 07:25      Room Air  


 


3/25/18 00:30      Room Air  


 


3/24/18 23:31 37.3 102 20 110/77 (88) 94 Room Air  


 


3/24/18 20:15     97 Room Air  


 


3/24/18 20:01 36.9 94 16 120/83 (95) 98 Room Air  


 


3/24/18 16:15     97 Room Air  


 


3/24/18 15:47 37.9 105 18 146/87 (106) 97 Room Air  








 (Sarah Mcclendon CRNP)





Physical Exam


Notes:


General: no distress


Eyes: normal inspection, PERLL


Respiratory: chest non tender, clear to auscultation, normal breath sounds, no 

respiratory distress, no accessory muscle use


Cardiac: regular rate and rhythm, no rub or gallop, no murmur, no edema, no jvd


GI/: active bowel sounds, tender abdomen to palpation, distended, soft


Extremities: normal range of motion, normal strength, non tender 


Neuro/Psych: alert and oriented x 3, flat affect


Skin: normal color, dry


 (Sarah Mcclendon CRNP)





Laboratory Results





Last 24 Hours








Test


  3/24/18


11:53 3/24/18


17:11 3/24/18


20:40 3/24/18


21:30


 


Bedside Glucose 240 mg/dl  157 mg/dl  148 mg/dl  


 


Urine Color    DK YELLOW 


 


Urine Appearance    CLEAR 


 


Urine pH    5.5 


 


Urine Specific Gravity    1.019 


 


Urine Protein    NEG 


 


Urine Glucose (UA)    1+ 


 


Urine Ketones    NEG 


 


Urine Occult Blood    TRACE 


 


Urine Nitrite    NEG 


 


Urine Bilirubin    NEG 


 


Urine Urobilinogen    POS 


 


Urine Leukocyte Esterase    SMALL 


 


Urine WBC (Auto)    5-10 /hpf 


 


Urine RBC (Auto)    10-30 /hpf 


 


Urine Hyaline Casts (Auto)    5-10 /lpf 


 


Urine Epithelial Cells (Auto)    10-20 /lpf 


 


Urine Bacteria (Auto)    NEG 


 


Test


  3/25/18


06:11 3/25/18


08:01 3/25/18


10:13 


 


 


White Blood Count 3.92 K/uL    


 


Red Blood Count 4.25 M/uL    


 


Hemoglobin 11.2 g/dL    


 


Hematocrit 34.9 %    


 


Mean Corpuscular Volume 82.1 fL    


 


Mean Corpuscular Hemoglobin 26.4 pg    


 


Mean Corpuscular Hemoglobin


Concent 32.1 g/dl 


  


  


  


 


 


RDW Standard Deviation 44.6 fL    


 


RDW Coefficient of Variation 14.8 %    


 


Platelet Count 252 K/uL    


 


Mean Platelet Volume 9.7 fL    


 


Sodium Level 133 mmol/L    


 


Potassium Level 3.6 mmol/L    


 


Chloride Level 99 mmol/L    


 


Carbon Dioxide Level 29 mmol/L    


 


Anion Gap 4.0 mmol/L    


 


Blood Urea Nitrogen 4 mg/dl    


 


Creatinine 0.32 mg/dl    


 


Est Creatinine Clear Calc


Drug Dose 264.1 ml/min 


  


  


  


 


 


Estimated GFR (


American) > 150.0 


  


  


  


 


 


Estimated GFR (Non-


American > 150.0 


  


  


  


 


 


BUN/Creatinine Ratio 13.6    


 


Random Glucose 152 mg/dl    


 


Calcium Level 7.5 mg/dl    


 


Total Bilirubin 0.5 mg/dl    


 


Direct Bilirubin 0.2 mg/dl    


 


Aspartate Amino Transf


(AST/SGOT) 93 U/L 


  


  


  


 


 


Alanine Aminotransferase


(ALT/SGPT) 157 U/L 


  


  


  


 


 


Alkaline Phosphatase 253 U/L    


 


Total Protein 5.1 gm/dl    


 


Albumin 1.4 gm/dl    


 


Bedside Glucose  152 mg/dl   








 (Sarah Mcclendon CRNP)





Assessment and Plan


Mr. Beckham is a 53 y/o male with PMHx of T2DM with Peripheral Neuropathy and 

CVA with L Residual Deficits who is S/P Open Retroperitoneal Mass Bx, 

Excisional Bx of Soft Tissue of L Lumbar Mass, and A Port in L Jugular. 

Hospitalist consulted for tachycardia and weakness.





Sinus Tachycardia: 


- EKG reviewed that shows sinus tachy without ischemic changes or other 

worrisome changes - tachycardia seems likely due to pain at this point


- Continue oxycodone


- mildly hyponatremic but otherwise electrolytes wnl


- CT negative for PE


- procalcitonin pending, CXR last evening shows possible right mild infiltrate, 

UC pending, bc pending - may need to initiate antibiotics pending results - no 

longer febrile, fever resolved last night on its own 





Hyponatremia - resolving


- likely due to dehydration - IVF discontinued last night as patient is up 7L - 

Na 133 today 





Elevated LFTs


- appears chronic - continues to trend down


- INR elevated at 1.9 on admission - given IV vit K 3/23- 1.1 today - possible 

due to poor po intake?


- hepatitis panel negative, CHAPIS pending


- consult GI - feel this is part of the lymphoma process - monitor outpatient 





Anemia


- serum iron low, ferritin high - likely acute phase reactant





Urinary Retention with BPH:


- KUB - SBO vs ileus - no nausea or vomiting - no intervention at this time


- discontinued bautista - patient voiding


-  UC showed no growth 3/21 - repeat pending


- Continue Flomax 0.4 mg HS





T2DM with Peripheral Neuropathy:


- bsgs improved with tightened ss, continue bsgs AC& HS 


- continue Gabapentin 300 mg TID





CVA with L Residual Deficits/history of seizure


- ASA 81 mg daily; Will defer Plavix 75 mg daily to primary team when they 

would like to continued


- Last seizure 2012 - serum Dilantin a bit low however as seizure history is 

remote, will leave dose as it is and patient can follow up outpatient - 

continue Dilantin 300 mg daily and Zoloft 50 mg HS





Dispo - OT eval pending, will need reeval by PT


 (Sarah Mcclendon ., JOSE EDUARDO)


Attending Attestation - 


Pt seen/examined, chart reviewed, care plan d/w JOSE EDUARDO Mcclendon.


I agree w/ the law components of her documentation.





Pt unchanged today.


Abd pain is unchanged; affect unchanged; does admit to some depression. 


Spoke with father by phone - does feel his son is depressed.


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 


Since then he has had memory loss and cognitive impairment. 





Tm 37.9


tachy


multiple stools





gen - looks the same as yesterday


mouth - MMM


neck - mild JVD


heart - tachy, s1, s2


lungs - mild b/l wheeze, mild rales bases


abd - softly distended, BS+, no tenderness


ext - no edema


neuro - left sided hemiplegia 


skin - incisions clean on abdominal wall 





labs - 


Na 133


LFTs again modestly improved





A/P:


1.  low-grade fever - resolved w/o antibiotics.  Urine cx neg.  Blood cx's neg.

  I am not convinced he has pneumonia; no symptoms of such.  Fever may be 2nd 

to lymphoma. 


2.  probable depression - consider antidepressant.


3.  coagulopathy - likely vit K def - resolved.


4.  abnormal LFTs - GI consult appreciated - thought to be due to lymphoma, but 

awaiting rest of w/u.  LFTs improving daily.


5.  hyponatremia - improved; I believe he is mildly volume overloaded (7+ L 

positive since admission).  Stop IVF; lasix 20mg po x 1. 


6.  newly dx Hodgkin's lymphoma - needs bone marrow bx followed by chemo.


7.  severe hypoalbuminemia - due to severe protein calorie malnutrition.  Agree 

with boost supplementation.  Pt will third space quickly with the low albumin.


8.  FEN - would stop fluids at this time; repeat labs am.





PT, OT both recommending rehab


father agrees that he and his wife likely would not be able to care for him at 

this time


will need to pursue rehab tomorrow 





Kirk White MD


 (Kirk White MD)

## 2018-03-25 NOTE — HEME/ONC PROGRESS NOTE
DATE: 03/25/2018

 

DIAGNOSES:

1. Status post day #4 laparoscopic retroperitoneal lymphadenectomy.

2. Low grade fever.

3. Elevated liver transaminases.

4. Hypoalbuminemia.

5. Coagulopathy attributable to nutritional deficit/vitamin K deficiency.

 

SUBJECTIVE:  Arsenio is a pleasant 52-year-old gentleman well known to me

seeing your initial consultation on 03/13/2018 with the diagnosis of

retroperitoneal lymphadenopathy.  He was subsequently admitted to [facility] on the 21st of March and underwent a laparoscopic

retroperitoneal lymphadenectomy, performed by Dr. Nichole.  However,

postop, he developed low grade fever as well as increased liver transaminases

and mild coagulopathy.  For the coagulopathy, I had recommended vitamin K,

which was given which resulted in a total correction.  His PT and INR was

performed yesterday measuring 11.4 and 1.1 respectively.  Pathology results

are final.  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 

PET scan has been done; however, will need a staging bone marrow biopsy and

aspiration.  The patient has also had MediPort inserted in anticipation of

receiving chemotherapy.  Clinically, doing a little better today and pain

seems to be controlled.  Nursing offers no overnight difficulties.

 

PHYSICAL EXAMINATION:

GENERAL:  Arsenio is in no acute distress, awake and alert.  His affect is

flat, which is his baseline.

VITAL SIGNS:  Temperature of 36.7, pulse 102, respirations 17, blood pressure

138/89.

SKIN:  Without rash or lesion.

HEENT:  Oral mucosa without erythema or ulceration.

NECK:  Supple.

HEART:  Regular rate and rhythm.

LUNGS:  Clear to auscultation bilaterally.

ABDOMEN:  Mildly distended.  No rigidity or guarding.  Bowel sounds are

hypoactive.

EXTREMITIES:  No clubbing, cyanosis or edema.

NEUROLOGIC:  Grossly intact.

 

LABORATORY DATA:  WBC count 3920, hemoglobin 11.2, and platelet count

252,000.  Sodium 133, potassium 3.6, chloride 99, carbon dioxide 29, BUN 4,

and creatinine 0.32.  AST 93, , and albumin 1.4.

 

IMPRESSION:

1. Day #4 status post laparoscopic retroperitoneal lymphadenectomy.

2. Nodular sclerosing Hodgkin lymphoma.

3. Hypoalbuminemia.

4. Elevated liver transaminases.

5. Coagulopathy.

6. Low grade fever.

 

PLAN:  Discussed confirmation of diagnosis with Arsenio today explaining that

he suffers from a nodular sclerosing Hodgkin lymphoma.  Generally, curable

disease; however, he will require chemotherapy.  MediPort has been placed and

Arsenio will require staging bone marrow biopsy and aspiration.  We will ask

Dr. Orellana to pull marrow tomorrow morning as there is insufficient

laboratory support to process the specimen on a Sunday.  He seems to be

clinically better and hopefully can be discharged in the next couple of days.

 I anticipate offering him ABVD, which is current standard of care;

however, there is a new literature recently published replacing bleomycin

with brentuximab.

 

Arsenio's liver transaminases seems to be normalizing but I am concerned with

his low albumin and initiating chemotherapy without some form of nutritional

support.  It may be reasonable to give him some albumin while

hospitalized and possibly look into a dietary/nutritionist consult.  Again,

thank you for allowing me to participate in his care.  We will continue to

follow him periodically during his hospital stay.

 

 

 

 

JACK

## 2018-03-25 NOTE — SURGERY PROGRESS NOTE
Surgery Progress Note


Date of Service


Mar 25, 2018.





Subjective


Post OP Day:  4


+ feeling well, + complaints (still with some pain), + bowel movement, + flatus

, + diet (eating only OK), No nausea, No vomiting





Objective


Vital Signs:











  Date Time  Temp Pulse Resp B/P (MAP) Pulse Ox O2 Delivery O2 Flow Rate FiO2


 


3/25/18 08:10 36.7 102 17 138/89 (105) 97 Room Air  


 


3/25/18 07:25      Room Air  


 


3/25/18 00:30      Room Air  


 


3/24/18 23:31 37.3 102 20 110/77 (88) 94 Room Air  


 


3/24/18 20:15     97 Room Air  


 


3/24/18 20:01 36.9 94 16 120/83 (95) 98 Room Air  


 


3/24/18 16:15     97 Room Air  


 


3/24/18 15:47 37.9 105 18 146/87 (106) 97 Room Air  








General Appearance:  WD/WN, no apparent distress


Head:  normocephalic, atraumatic


Neck:  supple, trachea midline


Respiratory/Chest:  lungs clear


Cardiovascular:  regular rate, rhythm, + tachycardia


Abdomen:  normal bowel sounds, soft, + distended, + tenderness (mild)


Incision(s):  clean, dry, intact


Extremities:  non-tender, no pedal edema


Laboratory Results:





Results Past 24 Hours








Test


  3/24/18


11:53 3/24/18


17:11 3/24/18


20:40 3/24/18


21:30 Range/Units


 


 


Bedside Glucose 240 157 148  70-99  mg/dl


 


Urine Color    DK YELLOW  


 


Urine Appearance    CLEAR CLEAR  


 


Urine pH    5.5 4.5-7.5  


 


Urine Specific Gravity    1.019 1.000-1.030  


 


Urine Protein    NEG NEG  


 


Urine Glucose (UA)    1+ NEG  


 


Urine Ketones    NEG NEG  


 


Urine Occult Blood    TRACE NEG  


 


Urine Nitrite    NEG NEG  


 


Urine Bilirubin    NEG NEG  


 


Urine Urobilinogen    POS NEG  


 


Urine Leukocyte Esterase    SMALL NEG  


 


Urine WBC (Auto)    5-10 0-5  /hpf


 


Urine RBC (Auto)    10-30 0-4  /hpf


 


Urine Hyaline Casts (Auto)    5-10 0-5  /lpf


 


Urine Epithelial Cells (Auto)    10-20 0-5  /lpf


 


Urine Bacteria (Auto)    NEG NEG  


 


Test


  3/25/18


06:11 3/25/18


08:01 


  


  Range/Units


 


 


White Blood Count 3.92    4.8-10.8  K/uL


 


Red Blood Count 4.25    4.7-6.1  M/uL


 


Hemoglobin 11.2    14.0-18.0  g/dL


 


Hematocrit 34.9    42-52  %


 


Mean Corpuscular Volume 82.1      fL


 


Mean Corpuscular Hemoglobin 26.4    25-34  pg


 


Mean Corpuscular Hemoglobin


Concent 32.1


  


  


  


  32-36  g/dl


 


 


RDW Standard Deviation 44.6    36.4-46.3  fL


 


RDW Coefficient of Variation 14.8    11.5-14.5  %


 


Platelet Count 252    130-400  K/uL


 


Mean Platelet Volume 9.7    7.4-10.4  fL


 


Sodium Level 133    136-145  mmol/L


 


Potassium Level 3.6    3.5-5.1  mmol/L


 


Chloride Level 99      mmol/L


 


Carbon Dioxide Level 29    21-32  mmol/L


 


Anion Gap 4.0    3-11  mmol/L


 


Blood Urea Nitrogen 4    7-18  mg/dl


 


Creatinine


  0.32


  


  


  


  0.60-1.40


mg/dl


 


Est Creatinine Clear Calc


Drug Dose 264.1


  


  


  


   ml/min


 


 


Estimated GFR (


American) > 150.0


  


  


  


   


 


 


Estimated GFR (Non-


American > 150.0


  


  


  


   


 


 


BUN/Creatinine Ratio 13.6    10-20  


 


Random Glucose 152    70-99  mg/dl


 


Calcium Level 7.5    8.5-10.1  mg/dl


 


Total Bilirubin 0.5    0.2-1  mg/dl


 


Direct Bilirubin 0.2    0-0.2  mg/dl


 


Aspartate Amino Transf


(AST/SGOT) 93


  


  


  


  15-37  U/L


 


 


Alanine Aminotransferase


(ALT/SGPT) 157


  


  


  


  12-78  U/L


 


 


Alkaline Phosphatase 253      U/L


 


Total Protein 5.1    6.4-8.2  gm/dl


 


Albumin 1.4    3.4-5.0  gm/dl


 


Bedside Glucose  152   70-99  mg/dl








Microbiology Results


3/24/18 Blood Culture, Received


          Pending


3/24/18 Blood Culture, Received


          Pending


3/24/18 Urine Culture, Received


          Pending





Assessment & Plan


Hodgkin's lymphoma


-plans to treat with chemo as outpatient; appt on Tuesday


-lily out


-taking po only OK


-pain better but still significant


-GI agrees with medical teams plan for elevated LFTs, ? related to lymphoma


-medicine wants to keep one more day, discharge in AM

## 2018-03-26 VITALS
SYSTOLIC BLOOD PRESSURE: 127 MMHG | TEMPERATURE: 98.42 F | HEART RATE: 105 BPM | OXYGEN SATURATION: 95 % | DIASTOLIC BLOOD PRESSURE: 77 MMHG

## 2018-03-26 VITALS
SYSTOLIC BLOOD PRESSURE: 118 MMHG | DIASTOLIC BLOOD PRESSURE: 78 MMHG | TEMPERATURE: 99.68 F | OXYGEN SATURATION: 94 % | HEART RATE: 116 BPM

## 2018-03-26 VITALS — TEMPERATURE: 97.52 F

## 2018-03-26 VITALS — HEART RATE: 112 BPM | SYSTOLIC BLOOD PRESSURE: 119 MMHG | DIASTOLIC BLOOD PRESSURE: 73 MMHG

## 2018-03-26 VITALS
TEMPERATURE: 98.96 F | SYSTOLIC BLOOD PRESSURE: 139 MMHG | DIASTOLIC BLOOD PRESSURE: 81 MMHG | OXYGEN SATURATION: 93 % | HEART RATE: 104 BPM

## 2018-03-26 VITALS — HEART RATE: 100 BPM | TEMPERATURE: 99.68 F

## 2018-03-26 VITALS — DIASTOLIC BLOOD PRESSURE: 75 MMHG | SYSTOLIC BLOOD PRESSURE: 107 MMHG

## 2018-03-26 LAB
ALBUMIN SERPL-MCNC: 1.4 GM/DL (ref 3.4–5)
ALP SERPL-CCNC: 234 U/L (ref 45–117)
ALT SERPL-CCNC: 116 U/L (ref 12–78)
AST SERPL-CCNC: 54 U/L (ref 15–37)
BASOPHILS # BLD: 0 K/UL (ref 0–0.2)
BASOPHILS NFR BLD: 0 %
BUN SERPL-MCNC: 5 MG/DL (ref 7–18)
CALCIUM SERPL-MCNC: 7.7 MG/DL (ref 8.5–10.1)
CO2 SERPL-SCNC: 29 MMOL/L (ref 21–32)
CREAT SERPL-MCNC: 0.33 MG/DL (ref 0.6–1.4)
EOS ABS #: 0.01 K/UL (ref 0–0.5)
EOSINOPHIL NFR BLD AUTO: 270 K/UL (ref 130–400)
GLUCOSE SERPL-MCNC: 154 MG/DL (ref 70–99)
HCT VFR BLD CALC: 33 % (ref 42–52)
HGB BLD-MCNC: 10.8 G/DL (ref 14–18)
IG#: 0.04 K/UL (ref 0–0.02)
IMM GRANULOCYTES NFR BLD AUTO: 5.8 %
INR PPP: 1.1 (ref 0.9–1.1)
LYMPHOCYTES # BLD: 0.29 K/UL (ref 1.2–3.4)
MCH RBC QN AUTO: 26.7 PG (ref 25–34)
MCHC RBC AUTO-ENTMCNC: 32.7 G/DL (ref 32–36)
MCV RBC AUTO: 81.7 FL (ref 80–100)
MONO ABS #: 0.42 K/UL (ref 0.11–0.59)
MONOCYTES NFR BLD: 8.4 %
NEUT ABS #: 4.25 K/UL (ref 1.4–6.5)
NEUTROPHILS # BLD AUTO: 0.2 %
NEUTROPHILS NFR BLD AUTO: 84.8 %
PMV BLD AUTO: 9.1 FL (ref 7.4–10.4)
POTASSIUM SERPL-SCNC: 4.1 MMOL/L (ref 3.5–5.1)
PROT SERPL-MCNC: 5 GM/DL (ref 6.4–8.2)
RED CELL DISTRIBUTION WIDTH CV: 15 % (ref 11.5–14.5)
RED CELL DISTRIBUTION WIDTH SD: 44.9 FL (ref 36.4–46.3)
SODIUM SERPL-SCNC: 133 MMOL/L (ref 136–145)
WBC # BLD AUTO: 5.01 K/UL (ref 4.8–10.8)

## 2018-03-26 RX ADMIN — INSULIN ASPART SCH UNITS: 100 INJECTION, SOLUTION INTRAVENOUS; SUBCUTANEOUS at 18:15

## 2018-03-26 RX ADMIN — Medication SCH MG: at 08:51

## 2018-03-26 RX ADMIN — INSULIN GLARGINE SCH UNITS: 100 INJECTION, SOLUTION SUBCUTANEOUS at 21:39

## 2018-03-26 RX ADMIN — GABAPENTIN SCH MG: 300 CAPSULE ORAL at 14:02

## 2018-03-26 RX ADMIN — INSULIN ASPART SCH UNITS: 100 INJECTION, SOLUTION INTRAVENOUS; SUBCUTANEOUS at 21:38

## 2018-03-26 RX ADMIN — STANDARDIZED SENNA CONCENTRATE SCH MG: 8.6 TABLET ORAL at 08:52

## 2018-03-26 RX ADMIN — INSULIN ASPART SCH UNITS: 100 INJECTION, SOLUTION INTRAVENOUS; SUBCUTANEOUS at 09:25

## 2018-03-26 RX ADMIN — Medication SCH CAN: at 18:08

## 2018-03-26 RX ADMIN — HEPARIN SODIUM SCH UNIT: 10000 INJECTION, SOLUTION INTRAVENOUS; SUBCUTANEOUS at 09:25

## 2018-03-26 RX ADMIN — GABAPENTIN SCH MG: 300 CAPSULE ORAL at 08:51

## 2018-03-26 RX ADMIN — HEPARIN SODIUM SCH UNIT: 10000 INJECTION, SOLUTION INTRAVENOUS; SUBCUTANEOUS at 21:39

## 2018-03-26 RX ADMIN — SERTRALINE HYDROCHLORIDE SCH MG: 50 TABLET, FILM COATED ORAL at 21:36

## 2018-03-26 RX ADMIN — GABAPENTIN SCH MG: 300 CAPSULE ORAL at 21:36

## 2018-03-26 RX ADMIN — INSULIN ASPART SCH UNITS: 100 INJECTION, SOLUTION INTRAVENOUS; SUBCUTANEOUS at 14:06

## 2018-03-26 RX ADMIN — PANTOPRAZOLE SCH MG: 40 TABLET, DELAYED RELEASE ORAL at 08:51

## 2018-03-26 RX ADMIN — Medication SCH CAN: at 08:49

## 2018-03-26 RX ADMIN — EXTENDED PHENYTOIN SODIUM SCH MG: 100 CAPSULE ORAL at 21:36

## 2018-03-26 RX ADMIN — TAMSULOSIN HYDROCHLORIDE SCH MG: 0.4 CAPSULE ORAL at 21:36

## 2018-03-26 RX ADMIN — OXYCODONE HYDROCHLORIDE AND ACETAMINOPHEN PRN TAB: 5; 325 TABLET ORAL at 14:02

## 2018-03-26 RX ADMIN — TIMOLOL MALEATE SCH DROPS: 5 SOLUTION OPHTHALMIC at 08:50

## 2018-03-26 RX ADMIN — OXYCODONE HYDROCHLORIDE AND ACETAMINOPHEN PRN TAB: 5; 325 TABLET ORAL at 07:42

## 2018-03-26 NOTE — HEMATOLOGY/ONCOLOGY PROG NOTE
Hematology/Onc Progress Note


Date of Service


Mar 26, 2018.





Diagnoses


Hodgkin's lymphoma





Medications





Medications Administered








 Medications


  (Trade)  Dose


 Ordered  Sig/Dominic


 Route  Start Time


 Stop Time Status Last Admin


Dose Admin


 


 Lactated Ringer's  1,000 ml @ 


 15 mls/hr  Q24H


 IV  3/20/18 06:00


 3/20/18 14:09 DC 3/20/18 08:38


15 MLS/HR


 


 Fentanyl Citrate


  (Fentanyl Inj)  50 mcg  Q5M  PRN


 IV  3/20/18 09:00


 3/20/18 16:00 DC 3/20/18 14:08


50 MCG


 


 Lidocaine HCl


  (Xylocaine 1%


 Inj (Local))  40 ml  STK-MED ONCE


 .ROUTE  3/20/18 09:57


 3/20/18 09:58 DC 3/20/18 12:59


20 ML


 


 Heparin Sodium


  (Porcine)


  (Heparin 100


 Unit/ml 5ml Flush)  25 ml  STK-MED ONCE


 .ROUTE  3/20/18 09:57


 3/20/18 09:58 DC 3/20/18 12:58


5 ML


 


 Bacitracin


  (Bacitracin Inj)  50,000 units  STK-MED ONCE


 .ROUTE  3/20/18 09:57


 3/20/18 09:58 DC 3/20/18 11:07


50,000 UNITS


 


 Miscellaneous


  (Surgicel Absorb


 Hemostat 2in X


 14in)  1 ea  ONE  ONCE


 TOP  3/20/18 11:42


 3/20/18 11:47 DC 3/20/18 11:47


1 EA


 


 Lactated Ringer's  1,000 ml @ 


 125 mls/hr  Q8H


 IV  3/20/18 15:15


 3/21/18 09:47 DC 3/21/18 05:50


75 MLS/HR


 


 Morphine Sulfate


  (MoRPHine


 SULFATE INJ)  4 mg  Q1H  PRN


 IV  3/20/18 13:45


 4/3/18 13:44  3/23/18 23:46


4 MG


 


 Insulin Aspart


  (novoLOG ASPART)  **SLIDING


 SCALE**


 **If C...  ACHS


 SC  3/20/18 16:00


 4/19/18 15:59  3/26/18 09:25


5 UNITS


 


 Aspirin


  (Ecotrin Tab)  81 mg  QAM


 PO  3/21/18 09:00


 4/20/18 08:59  3/26/18 08:51


81 MG


 


 Gabapentin


  (Neurontin Cap)  300 mg  TID


 PO  3/20/18 21:00


 4/19/18 20:59  3/26/18 08:51


300 MG


 


 Oxycodone HCl


  (Roxicodone


 Immediate Rel Tab)  10 mg  Q4H  PRN


 PO  3/20/18 13:45


 4/3/18 13:44  3/24/18 05:22


10 MG


 


 Pantoprazole


 Sodium


  (Protonix Tab)  40 mg  QAM


 PO  3/21/18 09:00


 4/20/18 08:59  3/26/18 08:51


40 MG


 


 Phenytoin Sodium


  (Dilantin Er Cap)  300 mg  QPM


 PO  3/20/18 21:00


 4/19/18 20:59  3/25/18 20:30


300 MG


 


 Sertraline HCl


  (Zoloft Tab)  50 mg  HS


 PO  3/20/18 21:00


 4/19/18 20:59  3/25/18 20:31


50 MG


 


 Tamsulosin HCl


  (Flomax Cap)  0.4 mg  HS


 PO  3/20/18 21:00


 4/19/18 20:59  3/25/18 20:30


0.4 MG


 


 Timolol Maleate


  (Timoptic 0.25%


 Oph Soln)  1 drops  QAM


 OPR  3/21/18 09:00


 4/20/18 08:59  3/26/18 08:50


1 DROPS


 


 Pneumococcal


 Polysaccharide


 Vaccine


  (Pneumovax-23


 Inj)  25 mcg  ONCE ONCE


 IM.  3/20/18 15:15


 3/20/18 15:16 DC 3/21/18 00:53


25 MCG


 


 Potassium


 Chloride/Sodium


 Chloride  1,000 ml @ 


 75 mls/hr  [id number]


 IV  3/21/18 10:15


 3/22/18 09:36 DC 3/22/18 02:24


125 MLS/HR


 


 Oxycodone/


 Acetaminophen


  (Percocet


 5-325mg Tab)  1 tab  Q4H  PRN


 PO  3/21/18 11:00


 3/24/18 12:11 DC 3/23/18 13:23


1 TAB


 


 Lidocaine HCl


  (Xylocaine Jelly


 2%)  1 APPLICATION


 PER URETHRA


 FOR URIN...  PRN  PRN


 EXT  3/21/18 15:30


 4/20/18 15:29  3/21/18 15:57


1 ML


 


 Calcium Carbonate


  (Tums Chew Tab)  500 mg  Q6H  PRN


 PO  3/21/18 18:30


 4/20/18 18:29  3/21/18 21:20


500 MG


 


 Senna


  (Senokot Tab)  17.2 mg  QAM


 PO  3/22/18 09:00


 4/21/18 08:59  3/25/18 09:21


17.2 MG


 


 Senna


  (Senokot Tab)  17.2 mg  1930  ONCE


 PO  3/21/18 19:30


 3/21/18 19:31 DC 3/21/18 21:18


17.2 MG


 


 Polyethylene


  (Miralax Powder


 Packet)  17 gm  DAILY  PRN


 PO  3/21/18 19:30


 4/20/18 19:29  3/23/18 15:37


17 GM


 


 Heparin Sodium


  (Porcine)


  (Heparin Sq 5000


 Unit/0.5ml)  5,000 unit  Q12


 SQ  3/22/18 12:30


 4/21/18 12:29  3/26/18 09:25


5,000 UNIT


 


 Sodium Chloride  1,000 ml @ 


 75 mls/hr  [id number]


 IV  3/22/18 09:45


 3/24/18 16:54 DC 3/24/18 13:19


75 MLS/HR


 


 Phytonadione 10


 mg/Sodium Chloride  51 ml @ 


 102 mls/hr  ONE  ONCE


 IV  3/23/18 16:00


 3/23/18 16:29 DC 3/23/18 16:14


102 MLS/HR


 


 Insulin Glargine


  (Lantus Solostar


 Pen)  10 units  HS


 SC  3/23/18 21:00


 4/22/18 20:59  3/25/18 20:37


10 UNITS


 


 Oxycodone/


 Acetaminophen


  (Percocet


 5-325mg Tab)   @   Q4H  PRN


 PO  3/24/18 12:15


 4/4/18 10:59  3/26/18 07:42


1 TAB


 


 Sodium Chloride  1,000 ml @ 


 80 mls/hr  [id number]


 IV  3/24/18 22:00


 3/25/18 12:46 DC 3/25/18 10:41


80 MLS/HR


 


 Furosemide


  (Lasix Tab)  20 mg  NOW  STAT


 PO  3/25/18 13:08


 3/25/18 13:09 DC 3/25/18 13:29


20 MG


 


 Enteral


 Nutritional


 Formula


  (Boost Glucose


 Control)  1 can  BIDM


 PO  3/25/18 17:45


 4/24/18 17:44  3/26/18 08:49


1 CAN











Subjective


She has been doing fairly well.  Staging will include a bone marrow biopsy.  I 

reviewed this with the patient as well as his parents today.


Review of Systems:


Constitutional:  Negative for night sweats, or fever


Eyes:  Negative for event change of vision


ENT:  Negative for epistaxis, nasal discharge, sore throat, or deafness


Cardiovascular:  Negative for chest pain, palpitations, dizziness, diaphoresis


Respiratory:  Negative for new shortness of breath,hemoptysis, or purulent cough


Gastrointestinal:  Negative for diarrhea, hematemesis, melena, nausea, vomiting

, or dyspepsia


Integumentary (skin):  Negative for rash or jaundice discoloration


Genitourinary:  Negative for urinary frequency, hematuria, or dysuria


Neurological:  Negative for weakness, seizure activity, headache, or dizziness


Lymphatic/Hematologic:  Negative for petechiae, bleeding or new adenopathy


Musculoskeletal:  Negative for new joint or back pain


Allergic/Immunologic:  Negative for unusual rash or pruritis.





Vital Signs





Vital Signs Past 12 Hours








  Date Time  Temp Pulse Resp B/P (MAP) Pulse Ox O2 Delivery O2 Flow Rate FiO2


 


3/26/18 09:59    123/60 (81)    





    123/75 (91)    





    107/71 (83)    


 


3/26/18 07:38 37.2 104 18 139/81 (100) 93 Room Air  


 


3/26/18 07:33      Room Air  


 


3/26/18 05:53  108  132/85 (101)    





  112  122/79 (93)    





  121  119/73 (88)    


 


3/25/18 23:45      Room Air  


 


3/25/18 23:44 37.6 101 18 112/74 (87) 95 Room Air  











Physical Exam


Constitutional: vitals are stable.


Eyes: Eyes are RADHA EOMI without conjuctival erythema or icterus.


ENT: External examination was negative for masses.


Neck: Negative for masses or palpable thyromegaly


Respiratory:  Lung sounds were generally clear bilaterally


Cardiovascular: Heart was RRR without significant murmur, gallops aoe rubs


Gastrointestinal: No palpable hepatic or splenomegaly. The abdomen was soft 

with normal bowel sounds.  Incision from recent laparoscopy and subsequent 

retroperitoneal node biopsy is well-healed


Lymphatic system:  there was no palpable peripheral lymphadenopathy


Musculoskeletal System:  The musculoskeletal system seemed concordant with age.


Skin: The skin was negative for jaundice.


Neurologic exam: History of CVA with resultant left hemiparesis


Psychiatric exam: Was essentially negative with normal mood and effect.


Extremities: negative for edema





Laboratory





Last 24 Hours








Test


  3/25/18


12:18 3/25/18


16:56 3/25/18


20:33 3/26/18


05:45


 


Bedside Glucose 186 mg/dl  137 mg/dl  161 mg/dl  


 


White Blood Count    5.01 K/uL 


 


Red Blood Count    4.04 M/uL 


 


Hemoglobin    10.8 g/dL 


 


Hematocrit    33.0 % 


 


Mean Corpuscular Volume    81.7 fL 


 


Mean Corpuscular Hemoglobin    26.7 pg 


 


Mean Corpuscular Hemoglobin


Concent 


  


  


  32.7 g/dl 


 


 


Platelet Count    270 K/uL 


 


Mean Platelet Volume    9.1 fL 


 


Neutrophils (%) (Auto)    84.8 % 


 


Lymphocytes (%) (Auto)    5.8 % 


 


Monocytes (%) (Auto)    8.4 % 


 


Eosinophils (%) (Auto)    0.2 % 


 


Basophils (%) (Auto)    0.0 % 


 


Neutrophils # (Auto)    4.25 K/uL 


 


Lymphocytes # (Auto)    0.29 K/uL 


 


Monocytes # (Auto)    0.42 K/uL 


 


Eosinophils # (Auto)    0.01 K/uL 


 


Basophils # (Auto)    0.00 K/uL 


 


RDW Standard Deviation    44.9 fL 


 


RDW Coefficient of Variation    15.0 % 


 


Immature Granulocyte % (Auto)    0.8 % 


 


Immature Granulocyte # (Auto)    0.04 K/uL 


 


Prothrombin Time    11.2 SECONDS 


 


Prothromb Time International


Ratio 


  


  


  1.1 


 


 


Sodium Level    133 mmol/L 


 


Potassium Level    4.1 mmol/L 


 


Chloride Level    99 mmol/L 


 


Carbon Dioxide Level    29 mmol/L 


 


Anion Gap    5.0 mmol/L 


 


Blood Urea Nitrogen    5 mg/dl 


 


Creatinine    0.33 mg/dl 


 


Est Creatinine Clear Calc


Drug Dose 


  


  


  256.1 ml/min 


 


 


Estimated GFR (


American) 


  


  


  > 150.0 


 


 


Estimated GFR (Non-


American 


  


  


  148.8 


 


 


BUN/Creatinine Ratio    15.1 


 


Random Glucose    154 mg/dl 


 


Calcium Level    7.7 mg/dl 


 


Magnesium Level    1.8 mg/dl 


 


Total Bilirubin    0.4 mg/dl 


 


Aspartate Amino Transf


(AST/SGOT) 


  


  


  54 U/L 


 


 


Alanine Aminotransferase


(ALT/SGPT) 


  


  


  116 U/L 


 


 


Alkaline Phosphatase    234 U/L 


 


Total Protein    5.0 gm/dl 


 


Albumin    1.4 gm/dl 


 


Globulin    3.6 gm/dl 


 


Albumin/Globulin Ratio    0.4 











Assessment & Plan


He is doing well.  To round out the staging studies we will ask for bone marrow 

biopsy.  This was reviewed with the patient as well as his parents are in the 

room today.  The consent was signed and a bone marrow biopsy and aspirate was 

obtained for the patient's left posterior iliac crest after applying 1% local 

regional anesthetic with lidocaine.  Both aspirate as well as marrow core was 

obtained without problem.  The patient tolerated procedure well.  Marrow will 

be sent not only for histology but also flow cytometric studies and 

cytogenetics.  Finally again in preparation for chemotherapy if an 

echocardiogram has not been done recently that is within a past few months and 

went to be updated.





NB: after review of chart I don't see where a recent echocardiogram has been 

done so I will order (Adriamycin therapy candidate)

## 2018-03-26 NOTE — SURGERY PROGRESS NOTE
DATE: 03/26/2018

 

Arsenio is 6th postoperative day status post laparoscopic with open procedure

to biopsy retroperitoneal mass that the final path report showed it to be a

malignant Hodgkin's lymphoma.  He also had a subcutaneous tissue removed from

his lower back, I think that it may have been metastatic lesions, but it was

a squamous keratosis, benign.  Dr. Alberto's note from oncology was appreciated.

 Arsenio is sitting there in bed in no acute distress at this time, just

generally feeling lousy.  His oral intake according to him is good, but a

question if all the significance of is intact.  His vital signs showed him a

temperature last night of 37.6, pulse 108 to 121, blood pressure 122/85, O2

sats 90 on room air.  I&O, the patient has had 3 bowel movements.  Urine

output is 1225 yesterday.  The abdomen is soft, it is not distended.  The

right upper quadrant has small incision, benign and staples intact.  At this

point, we will leave further management to the medical service.  We will

discuss with them about transferring to their services surgically.  There is

nothing more that we can be done.  He has an A-port that can certainly be

accessed.  The other issue that we discussed with the medical service, the

patient had been on Plavix and whether or not it should be resumed once the

more invasive procedures have completed.  I suspect he is ready for a bone

marrow biopsy today.

## 2018-03-26 NOTE — ECHOCARDIOGRAM REPORT
*NOTICE TO RECEIVING PARTY AGENCY**  This information is strictly Confidential and protected under 
[place] law.  [place] law prohibits you from making any further disclosure of this 
information unless further disclosure is expressly permitted by the written consent of the person to 
whom it pertains or is authorized by law.  A general authorization for the release of medical or 
other information is not sufficient for this purpose.  Hospital accepts no responsibility if the 
information is made available to any other person, INCLUDING THE PATIENT.



Interpretation Summary

  *  Name: ALISA ANTHONY  Study Date: 2018 01:09 PM  BP: 107/71 mmHg

  *  MRN: [id number]  Patient Location: [facility]\S\[id number]\S\1  HR: 104

  *  : 1965 (M/[birth year]/yyyy)  Gender: Male  Height: 68 in

  *  Age: 52 yrs  Ethnicity: [place]  Weight: 172 lb

  *  Ordering Physician: Avinash Orellana

  *  Referring Physician: Abhishek Nichole

  *  Performed By: SHAN Brewer

  *  , [facility]

  *  Accession# [id number]  Account# [id number]

  *  Reason For Study: Adriamycin Candidate, Pre-CHEMO eval

  *  BSA: 1.9 m2

  *  -- Conclusions --

  *  Left ventricular systolic function is normal.

  *  Grade I diastolic dysfunction, (abnormal relaxation pattern).

  *  Right ventricular systolic pressure is normal.

Procedure Details

  *  A complete two-dimensional transthoracic echocardiogram was performed (2D, M-mode, Doppler and 
color flow Doppler).

  *  There were technical limitations due to patient's supine positioning for imagining

Left Ventricle

  *  The left ventricle is normal in size.

  *  There is normal left ventricular wall thickness.

  *  Ejection Fraction = 65-70%.

  *  Left ventricular systolic function is normal.

  *  Grade I diastolic dysfunction, (abnormal relaxation pattern).

  *  The left ventricular wall motion is normal.

Right Ventricle

  *  The right ventricle is normal in size and function.

  *  The right ventricular systolic function is normal as assessed by tricuspid annular plane 
systolic excursion (TAPSE) (normal >1.5 cm).

Atria

  *  The left atrial size is normal.

  *  Right atrial size is normal.

Mitral Valve

  *  The mitral valve is grossly normal.

  *  Significant mitral regurgitation is absent.

Tricuspid Valve

  *  The tricuspid valve is not well visualized, but is grossly normal.

  *  There is trace tricuspid regurgitation.

  *  Right ventricular systolic pressure is normal.

Aortic Valve

  *  The aortic valve is normal in structure and function.

  *  No hemodynamically significant valvular aortic stenosis.

  *  There is no significant aortic regurgitation.

Pulmonic Valve

  *  The pulmonic valve is not well visualized.

Great Vessels

  *  The aortic root is normal size.

Pericardium/Pleural

  *  There is no pericardial effusion.



MMode 2D Measurements and Calculations

IVSd 0.95 cm

IVSs 1.1 cm



LVIDd 3.3 cm

LVIDs 2.1 cm

LVPWd 0.92 cm

LVPWs 1.1 cm



IVS/LVPW 1.0 

FS 37.7 %

EDV(Teich) 45.1 ml

ESV(Teich) 14.0 ml

EF(Teich) 69.0 %



EDV(cubed) 36.9 ml

ESV(cubed) 8.9 ml

EF(cubed) 75.8 %

% IVS thick 13.9 %

% LVPW thick 18.7 %





LV mass(C)d 86.4 grams

LV mass(C)dI 45.1 grams/m\S\2

LV mass(C)s 56.6 grams

LV mass(C)sI 29.5 grams/m\S\2



SV(Teich) 31.1 ml

SI(Teich) 16.2 ml/m\S\2

SV(cubed) 28.0 ml

SI(cubed) 14.6 ml/m\S\2



Ao root diam 3.0 cm

Ao root area 7.2 cm\S\2

ACS 1.5 cm

LA dimension 3.8 cm



asc Aorta Diam 3.1 cm





LA/Ao 1.3 



EDV(MOD-sp4) 76.4 ml

ESV(MOD-sp4) 26.9 ml

EF(MOD-sp4) 64.7 %



EDV(MOD-sp2) 91.9 ml

ESV(MOD-sp2) 26.7 ml

EF(MOD-sp2) 70.9 %



SV(MOD-sp4) 49.4 ml

SI(MOD-sp4) 25.8 ml/m\S\2





SV(MOD-sp2) 65.1 ml

SI(MOD-sp2) 34.0 ml/m\S\2













Doppler Measurements and Calculations

MV E max daina 79.3 cm/sec

MV A max daina 92.5 cm/sec



MV E/A 0.86 



MV P1/2t max daina 85.0 cm/sec

MV P1/2t 66.1 msec

MVA(P1/2t) 3.3 cm\S\2

MV dec slope 376.6 cm/sec\S\2

MV dec time 0.16 sec



Ao V2 max 146.6 cm/sec

Ao max PG 8.6 mmHg

Ao max PG (full) 3.7 mmHg





LV V1 max PG 4.9 mmHg



LV V1 max 110.8 cm/sec



PA V2 max 103.5 cm/sec

PA max PG 4.3 mmHg



TR max daina 165.3 cm/sec

## 2018-03-26 NOTE — HOSPITALIST PROGRESS NOTE
Hospitalist Progress Note


Date of Service


Mar 26, 2018.


 (Sarah Mcclendon ., JOSE EDUARDO)





Subjective


Pt evaluation today including:  conversation w/ patient, physical exam, chart 

review, lab review, review of inpatient medication list


Voiding:  no voiding problems


Mr. Beckham is feeling somewhat better.  Getting out to the chair for his shift.

  Pain is improved





ROS


Constitutional: no chills, aches, sweats or fever


Respiratory: no sob,cough, sputum, or wheezing


Cardiac: no chest pain, palpitations, edema, orthopnea or lightheadedness


GI: see HPI


: no dysuria or hesitancy


Extremities: no joint pain or weakness


Skin: no rash





All other systems reviewed and negative


 (Sarah Mcclendon CRNP)





Medications





Medications Administered








 Medications


  (Trade)  Dose


 Ordered  Sig/Dominic


 Route  Start Time


 Stop Time Status Last Admin


Dose Admin


 


 Lactated Ringer's  1,000 ml @ 


 15 mls/hr  Q24H


 IV  3/20/18 06:00


 3/20/18 14:09 DC 3/20/18 08:38


15 MLS/HR


 


 Fentanyl Citrate


  (Fentanyl Inj)  50 mcg  Q5M  PRN


 IV  3/20/18 09:00


 3/20/18 16:00 DC 3/20/18 14:08


50 MCG


 


 Lidocaine HCl


  (Xylocaine 1%


 Inj (Local))  40 ml  STK-MED ONCE


 .ROUTE  3/20/18 09:57


 3/20/18 09:58 DC 3/20/18 12:59


20 ML


 


 Heparin Sodium


  (Porcine)


  (Heparin 100


 Unit/ml 5ml Flush)  25 ml  STK-MED ONCE


 .ROUTE  3/20/18 09:57


 3/20/18 09:58 DC 3/20/18 12:58


5 ML


 


 Bacitracin


  (Bacitracin Inj)  50,000 units  STK-MED ONCE


 .ROUTE  3/20/18 09:57


 3/20/18 09:58 DC 3/20/18 11:07


50,000 UNITS


 


 Miscellaneous


  (Surgicel Absorb


 Hemostat 2in X


 14in)  1 ea  ONE  ONCE


 TOP  3/20/18 11:42


 3/20/18 11:47 DC 3/20/18 11:47


1 EA


 


 Lactated Ringer's  1,000 ml @ 


 125 mls/hr  Q8H


 IV  3/20/18 15:15


 3/21/18 09:47 DC 3/21/18 05:50


75 MLS/HR


 


 Morphine Sulfate


  (MoRPHine


 SULFATE INJ)  4 mg  Q1H  PRN


 IV  3/20/18 13:45


 4/3/18 13:44  3/23/18 23:46


4 MG


 


 Insulin Aspart


  (novoLOG ASPART)  **SLIDING


 SCALE**


 **If C...  ACHS


 SC  3/20/18 16:00


 4/19/18 15:59  3/26/18 14:06


5 UNITS


 


 Aspirin


  (Ecotrin Tab)  81 mg  QAM


 PO  3/21/18 09:00


 4/20/18 08:59  3/26/18 08:51


81 MG


 


 Gabapentin


  (Neurontin Cap)  300 mg  TID


 PO  3/20/18 21:00


 4/19/18 20:59  3/26/18 14:02


300 MG


 


 Oxycodone HCl


  (Roxicodone


 Immediate Rel Tab)  10 mg  Q4H  PRN


 PO  3/20/18 13:45


 4/3/18 13:44  3/24/18 05:22


10 MG


 


 Pantoprazole


 Sodium


  (Protonix Tab)  40 mg  QAM


 PO  3/21/18 09:00


 4/20/18 08:59  3/26/18 08:51


40 MG


 


 Phenytoin Sodium


  (Dilantin Er Cap)  300 mg  QPM


 PO  3/20/18 21:00


 4/19/18 20:59  3/25/18 20:30


300 MG


 


 Sertraline HCl


  (Zoloft Tab)  50 mg  HS


 PO  3/20/18 21:00


 4/19/18 20:59  3/25/18 20:31


50 MG


 


 Tamsulosin HCl


  (Flomax Cap)  0.4 mg  HS


 PO  3/20/18 21:00


 4/19/18 20:59  3/25/18 20:30


0.4 MG


 


 Timolol Maleate


  (Timoptic 0.25%


 Oph Soln)  1 drops  QAM


 OPR  3/21/18 09:00


 4/20/18 08:59  3/26/18 08:50


1 DROPS


 


 Pneumococcal


 Polysaccharide


 Vaccine


  (Pneumovax-23


 Inj)  25 mcg  ONCE ONCE


 IM.  3/20/18 15:15


 3/20/18 15:16 DC 3/21/18 00:53


25 MCG


 


 Potassium


 Chloride/Sodium


 Chloride  1,000 ml @ 


 75 mls/hr  [id number]


 IV  3/21/18 10:15


 3/22/18 09:36 DC 3/22/18 02:24


125 MLS/HR


 


 Oxycodone/


 Acetaminophen


  (Percocet


 5-325mg Tab)  1 tab  Q4H  PRN


 PO  3/21/18 11:00


 3/24/18 12:11 DC 3/23/18 13:23


1 TAB


 


 Lidocaine HCl


  (Xylocaine Jelly


 2%)  1 APPLICATION


 PER URETHRA


 FOR URIN...  PRN  PRN


 EXT  3/21/18 15:30


 4/20/18 15:29  3/21/18 15:57


1 ML


 


 Calcium Carbonate


  (Tums Chew Tab)  500 mg  Q6H  PRN


 PO  3/21/18 18:30


 4/20/18 18:29  3/21/18 21:20


500 MG


 


 Senna


  (Senokot Tab)  17.2 mg  QAM


 PO  3/22/18 09:00


 4/21/18 08:59  3/25/18 09:21


17.2 MG


 


 Senna


  (Senokot Tab)  17.2 mg  1930  ONCE


 PO  3/21/18 19:30


 3/21/18 19:31 DC 3/21/18 21:18


17.2 MG


 


 Polyethylene


  (Miralax Powder


 Packet)  17 gm  DAILY  PRN


 PO  3/21/18 19:30


 4/20/18 19:29  3/23/18 15:37


17 GM


 


 Heparin Sodium


  (Porcine)


  (Heparin Sq 5000


 Unit/0.5ml)  5,000 unit  Q12


 SQ  3/22/18 12:30


 4/21/18 12:29  3/26/18 09:25


5,000 UNIT


 


 Sodium Chloride  1,000 ml @ 


 75 mls/hr  [id number]


 IV  3/22/18 09:45


 3/24/18 16:54 DC 3/24/18 13:19


75 MLS/HR


 


 Phytonadione 10


 mg/Sodium Chloride  51 ml @ 


 102 mls/hr  ONE  ONCE


 IV  3/23/18 16:00


 3/23/18 16:29 DC 3/23/18 16:14


102 MLS/HR


 


 Insulin Glargine


  (Lantus Solostar


 Pen)  10 units  HS


 SC  3/23/18 21:00


 4/22/18 20:59  3/25/18 20:37


10 UNITS


 


 Oxycodone/


 Acetaminophen


  (Percocet


 5-325mg Tab)   @   Q4H  PRN


 PO  3/24/18 12:15


 4/4/18 10:59  3/26/18 14:02


1 TAB


 


 Sodium Chloride  1,000 ml @ 


 80 mls/hr  [id number]


 IV  3/24/18 22:00


 3/25/18 12:46 DC 3/25/18 10:41


80 MLS/HR


 


 Furosemide


  (Lasix Tab)  20 mg  NOW  STAT


 PO  3/25/18 13:08


 3/25/18 13:09 DC 3/25/18 13:29


20 MG


 


 Enteral


 Nutritional


 Formula


  (Boost Glucose


 Control)  1 can  BIDM


 PO  3/25/18 17:45


 4/24/18 17:44  3/26/18 18:08


1 CAN








 (Sarah Mcclendon CRNP)





Objective


Vital Signs











  Date Time  Temp Pulse Resp B/P (MAP) Pulse Ox O2 Delivery O2 Flow Rate FiO2


 


3/26/18 14:59 37.6 116 16 118/78 (91) 94 Room Air  


 


3/26/18 09:59    123/60 (81)    





    123/75 (91)    





    107/71 (83)    


 


3/26/18 07:38 37.2 104 18 139/81 (100) 93 Room Air  


 


3/26/18 07:33      Room Air  


 


3/26/18 05:53  108  132/85 (101)    





  112  122/79 (93)    





  121  119/73 (88)    


 


3/25/18 23:45      Room Air  


 


3/25/18 23:44 37.6 101 18 112/74 (87) 95 Room Air  








 (Sarah Mcclendon CRNP)





Physical Exam


Notes:


General: no distress


Eyes: normal inspection, PERLL


Respiratory: chest non tender, clear to auscultation, normal breath sounds, no 

respiratory distress, no accessory muscle use


Cardiac: regular rate and rhythm, no rub or gallop, no murmur, no edema, no jvd


GI/: active bowel sounds,mild tenderness, soft, distended


Extremities: normal range of motion, normal strength, non tender 


Neuro/Psych: alert and oriented x 3, flat affect


Skin: normal color, dry


 (Sarah Mcclendon CRNP)





Laboratory Results





Last 24 Hours








Test


  3/25/18


20:33 3/26/18


00:00 3/26/18


05:45 3/26/18


12:01


 


Bedside Glucose 161 mg/dl    208 mg/dl 


 


White Blood Count   5.01 K/uL  


 


Red Blood Count   4.04 M/uL  


 


Hemoglobin   10.8 g/dL  


 


Hematocrit   33.0 %  


 


Mean Corpuscular Volume   81.7 fL  


 


Mean Corpuscular Hemoglobin   26.7 pg  


 


Mean Corpuscular Hemoglobin


Concent 


  


  32.7 g/dl 


  


 


 


Platelet Count   270 K/uL  


 


Mean Platelet Volume   9.1 fL  


 


Neutrophils (%) (Auto)   84.8 %  


 


Lymphocytes (%) (Auto)   5.8 %  


 


Monocytes (%) (Auto)   8.4 %  


 


Eosinophils (%) (Auto)   0.2 %  


 


Basophils (%) (Auto)   0.0 %  


 


Neutrophils # (Auto)   4.25 K/uL  


 


Lymphocytes # (Auto)   0.29 K/uL  


 


Monocytes # (Auto)   0.42 K/uL  


 


Eosinophils # (Auto)   0.01 K/uL  


 


Basophils # (Auto)   0.00 K/uL  


 


RDW Standard Deviation   44.9 fL  


 


RDW Coefficient of Variation   15.0 %  


 


Immature Granulocyte % (Auto)   0.8 %  


 


Immature Granulocyte # (Auto)   0.04 K/uL  


 


Prothrombin Time   11.2 SECONDS  


 


Prothromb Time International


Ratio 


  


  1.1 


  


 


 


Sodium Level   133 mmol/L  


 


Potassium Level   4.1 mmol/L  


 


Chloride Level   99 mmol/L  


 


Carbon Dioxide Level   29 mmol/L  


 


Anion Gap   5.0 mmol/L  


 


Blood Urea Nitrogen   5 mg/dl  


 


Creatinine   0.33 mg/dl  


 


Est Creatinine Clear Calc


Drug Dose 


  


  256.1 ml/min 


  


 


 


Estimated GFR (


American) 


  


  > 150.0 


  


 


 


Estimated GFR (Non-


American 


  


  148.8 


  


 


 


BUN/Creatinine Ratio   15.1  


 


Random Glucose   154 mg/dl  


 


Calcium Level   7.7 mg/dl  


 


Magnesium Level   1.8 mg/dl  


 


Total Bilirubin   0.4 mg/dl  


 


Aspartate Amino Transf


(AST/SGOT) 


  


  54 U/L 


  


 


 


Alanine Aminotransferase


(ALT/SGPT) 


  


  116 U/L 


  


 


 


Alkaline Phosphatase   234 U/L  


 


Total Protein   5.0 gm/dl  


 


Albumin   1.4 gm/dl  


 


Globulin   3.6 gm/dl  


 


Albumin/Globulin Ratio   0.4  








 (Sarah Mcclendon, JOSE EDUARDO)





Assessment and Plan


Mr. Beckham is a 51 y/o male with PMHx of T2DM with Peripheral Neuropathy and 

CVA with L Residual Deficits who is S/P Open Retroperitoneal Mass Bx, 

Excisional Bx of Soft Tissue of L Lumbar Mass, and A Port in L Jugular. 

Hospitalist consulted for tachycardia and weakness.





Sinus Tachycardia: 


- EKG reviewed that shows sinus tachy without ischemic changes or other 

worrisome changes - tachycardia seems likely due to pain at this point


- Continue oxycodone


- mildly hyponatremic but otherwise electrolytes wnl


- CT negative for PE


- procalcitonin wnl,  UC/ BC no growth - no longer febrile, fever resolved last 

night on its own - likely due to the lymphoma





Hyponatremia - resolving


- likely due to dehydration - IVF discontinued last night as patient is up 8L - 

Na 133 today 





Elevated LFTs


- appears chronic - continues to trend down


- INR elevated at 1.9 on admission - given IV vit K 3/23- 1.1 today - possible 

due to poor po intake?


- hepatitis panel negative, CHAPIS pending


- consult GI - feel this is part of the lymphoma process - monitor outpatient 





Anemia


- serum iron low, ferritin high - likely acute phase reactant





Urinary Retention with BPH:


- KUB - SBO vs ileus - no nausea or vomiting - no intervention at this time


- discontinued bautista - patient voiding


-  UC showed no growth 3/21 - repeat pending


- Continue Flomax 0.4 mg HS





T2DM with Peripheral Neuropathy:


- bsgs improved with tightened ss, continue bsgs AC& HS 


- continue Gabapentin 300 mg TID





CVA with L Residual Deficits/history of seizure


- ASA 81 mg daily; Will defer Plavix 75 mg daily to primary team when they 

would like to continued


- Last seizure 2012 - serum Dilantin a bit low however as seizure history is 

remote, will leave dose as it is and patient can follow up outpatient - 

continue Dilantin 300 mg daily and Zoloft 50 mg HS





Dispo - inpatient rehab


 (Sarah Mcclendon ., JOSE EDUARDO)


Attending Attestation - 


Pt seen/examined, chart reviewed, care plan d/w JOSE EDUARDO Mcclendon.


I agree w/ the law components of her documentation.





Pt w/o complaints today; just mild abd pain. 


To have bone marrow bx today by Dr. Orellana.


Denies any dyspnea or significant cough.


+bowel movements. 





afebrile overnight


VSS


minimal tachycardia 





gen - looks better today than yesterday


mouth - MMM


neck - JVD resolved


heart - tachy, s1, s2


lungs - minimal b/l wheeze, minimal rales right base


abd - softly distended, BS+, incisional tenderness only 


ext - no edema


neuro - left sided hemiplegia 


skin - incisions clean on abdominal wall 





labs - 


Na 133


LFTs continue downward trend





A/P:


1.  low-grade fever - resolved w/o antibiotics.  Urine cx neg.  Blood cx's neg.

  I am not convinced he has pneumonia; no symptoms of such.  Suspect fever 2nd 

to lymphoma. 


2.  probable depression - consider antidepressant.


3.  vit K def w/ coagulopathy - resolved.


4.  abnormal LFTs - GI consult appreciated - thought to be due to lymphoma, but 

awaiting rest of w/u.  LFTs improving daily.


5.  hyponatremia - improved.


6.  newly dx Hodgkin's lymphoma - bone marrow bx today.


7.  severe hypoalbuminemia - due to severe protein calorie malnutrition.  Agree 

with boost supplementation.  Pt will third space quickly with the low albumin.  

Another dose of lasix today due to volume overload state. 


8.  FEN - BMP acceptable, repeat bmp am. 





PT, OT both recommending rehab


father agrees that he and his wife likely would not be able to care for him at 

this time


social work aware of need for placement 





Kirk White MD


 (Kirk White MD)

## 2018-03-27 VITALS
HEART RATE: 107 BPM | TEMPERATURE: 98.96 F | SYSTOLIC BLOOD PRESSURE: 140 MMHG | DIASTOLIC BLOOD PRESSURE: 76 MMHG | OXYGEN SATURATION: 94 %

## 2018-03-27 VITALS
SYSTOLIC BLOOD PRESSURE: 126 MMHG | DIASTOLIC BLOOD PRESSURE: 77 MMHG | OXYGEN SATURATION: 91 % | HEART RATE: 109 BPM | TEMPERATURE: 98.42 F

## 2018-03-27 VITALS
SYSTOLIC BLOOD PRESSURE: 116 MMHG | TEMPERATURE: 98.24 F | DIASTOLIC BLOOD PRESSURE: 72 MMHG | HEART RATE: 112 BPM | OXYGEN SATURATION: 93 %

## 2018-03-27 VITALS
HEART RATE: 105 BPM | OXYGEN SATURATION: 91 % | TEMPERATURE: 99.32 F | DIASTOLIC BLOOD PRESSURE: 72 MMHG | SYSTOLIC BLOOD PRESSURE: 123 MMHG

## 2018-03-27 LAB
BUN SERPL-MCNC: 6 MG/DL (ref 7–18)
CALCIUM SERPL-MCNC: 7.9 MG/DL (ref 8.5–10.1)
CO2 SERPL-SCNC: 27 MMOL/L (ref 21–32)
CREAT SERPL-MCNC: 0.42 MG/DL (ref 0.6–1.4)
GLUCOSE SERPL-MCNC: 145 MG/DL (ref 70–99)
POTASSIUM SERPL-SCNC: 3.5 MMOL/L (ref 3.5–5.1)
SODIUM SERPL-SCNC: 132 MMOL/L (ref 136–145)

## 2018-03-27 RX ADMIN — PANTOPRAZOLE SCH MG: 40 TABLET, DELAYED RELEASE ORAL at 09:28

## 2018-03-27 RX ADMIN — STANDARDIZED SENNA CONCENTRATE SCH MG: 8.6 TABLET ORAL at 09:00

## 2018-03-27 RX ADMIN — EXTENDED PHENYTOIN SODIUM SCH MG: 100 CAPSULE ORAL at 20:31

## 2018-03-27 RX ADMIN — OXYCODONE HYDROCHLORIDE AND ACETAMINOPHEN PRN TAB: 5; 325 TABLET ORAL at 13:41

## 2018-03-27 RX ADMIN — Medication SCH CAN: at 17:45

## 2018-03-27 RX ADMIN — OXYCODONE HYDROCHLORIDE AND ACETAMINOPHEN PRN TAB: 5; 325 TABLET ORAL at 13:43

## 2018-03-27 RX ADMIN — INSULIN ASPART SCH UNITS: 100 INJECTION, SOLUTION INTRAVENOUS; SUBCUTANEOUS at 20:36

## 2018-03-27 RX ADMIN — INSULIN GLARGINE SCH UNITS: 100 INJECTION, SOLUTION SUBCUTANEOUS at 20:37

## 2018-03-27 RX ADMIN — GABAPENTIN SCH MG: 300 CAPSULE ORAL at 20:31

## 2018-03-27 RX ADMIN — SERTRALINE HYDROCHLORIDE SCH MG: 50 TABLET, FILM COATED ORAL at 20:31

## 2018-03-27 RX ADMIN — INSULIN ASPART SCH UNITS: 100 INJECTION, SOLUTION INTRAVENOUS; SUBCUTANEOUS at 12:00

## 2018-03-27 RX ADMIN — Medication SCH CAN: at 08:30

## 2018-03-27 RX ADMIN — HEPARIN SODIUM SCH UNIT: 10000 INJECTION, SOLUTION INTRAVENOUS; SUBCUTANEOUS at 09:36

## 2018-03-27 RX ADMIN — GABAPENTIN SCH MG: 300 CAPSULE ORAL at 09:28

## 2018-03-27 RX ADMIN — OXYCODONE HYDROCHLORIDE AND ACETAMINOPHEN PRN TAB: 5; 325 TABLET ORAL at 07:33

## 2018-03-27 RX ADMIN — INSULIN ASPART SCH UNITS: 100 INJECTION, SOLUTION INTRAVENOUS; SUBCUTANEOUS at 19:07

## 2018-03-27 RX ADMIN — GABAPENTIN SCH MG: 300 CAPSULE ORAL at 13:13

## 2018-03-27 RX ADMIN — Medication SCH MG: at 09:27

## 2018-03-27 RX ADMIN — TIMOLOL MALEATE SCH DROPS: 5 SOLUTION OPHTHALMIC at 09:27

## 2018-03-27 RX ADMIN — HEPARIN SODIUM SCH UNIT: 10000 INJECTION, SOLUTION INTRAVENOUS; SUBCUTANEOUS at 20:37

## 2018-03-27 RX ADMIN — TAMSULOSIN HYDROCHLORIDE SCH MG: 0.4 CAPSULE ORAL at 20:31

## 2018-03-27 RX ADMIN — INSULIN ASPART SCH UNITS: 100 INJECTION, SOLUTION INTRAVENOUS; SUBCUTANEOUS at 08:00

## 2018-03-27 NOTE — HOSPITALIST PROGRESS NOTE
Hospitalist Progress Note


Date of Service


Mar 27, 2018.


 (Sarah Mcclendon .JOSE EDUARDO)





Subjective


Pt evaluation today including:  conversation w/ patient, physical exam, chart 

review, lab review, review of inpatient medication list


Voiding:  no voiding problems


Mr. Beckham is seated in a chair, his abdominal pain is somewhat worse today 

than yesterday as he states changing positions does make it worse. He otherwise 

does not have complaints.





ROS


Constitutional: no chills, aches, sweats or fever


Respiratory: no sob,cough, sputum, or wheezing


Cardiac: no chest pain, palpitations, edema, orthopnea or lightheadedness


GI: see HPI


: no dysuria or hesitancy


Extremities: no joint pain or weakness


Skin: no rash





All other systems reviewed and negative


 (Sarah Mcclendon CRNP)





Medications





Medications Administered








 Medications


  (Trade)  Dose


 Ordered  Sig/Dominic


 Route  Start Time


 Stop Time Status Last Admin


Dose Admin


 


 Lactated Ringer's  1,000 ml @ 


 15 mls/hr  Q24H


 IV  3/20/18 06:00


 3/20/18 14:09 DC 3/20/18 08:38


15 MLS/HR


 


 Fentanyl Citrate


  (Fentanyl Inj)  50 mcg  Q5M  PRN


 IV  3/20/18 09:00


 3/20/18 16:00 DC 3/20/18 14:08


50 MCG


 


 Lidocaine HCl


  (Xylocaine 1%


 Inj (Local))  40 ml  STK-MED ONCE


 .ROUTE  3/20/18 09:57


 3/20/18 09:58 DC 3/20/18 12:59


20 ML


 


 Heparin Sodium


  (Porcine)


  (Heparin 100


 Unit/ml 5ml Flush)  25 ml  STK-MED ONCE


 .ROUTE  3/20/18 09:57


 3/20/18 09:58 DC 3/20/18 12:58


5 ML


 


 Bacitracin


  (Bacitracin Inj)  50,000 units  STK-MED ONCE


 .ROUTE  3/20/18 09:57


 3/20/18 09:58 DC 3/20/18 11:07


50,000 UNITS


 


 Miscellaneous


  (Surgicel Absorb


 Hemostat 2in X


 14in)  1 ea  ONE  ONCE


 TOP  3/20/18 11:42


 3/20/18 11:47 DC 3/20/18 11:47


1 EA


 


 Lactated Ringer's  1,000 ml @ 


 125 mls/hr  Q8H


 IV  3/20/18 15:15


 3/21/18 09:47 DC 3/21/18 05:50


75 MLS/HR


 


 Morphine Sulfate


  (MoRPHine


 SULFATE INJ)  4 mg  Q1H  PRN


 IV  3/20/18 13:45


 4/3/18 13:44  3/23/18 23:46


4 MG


 


 Insulin Aspart


  (novoLOG ASPART)  **SLIDING


 SCALE**


 **If C...  ACHS


 SC  3/20/18 16:00


 4/19/18 15:59  3/27/18 12:00


6 UNITS


 


 Aspirin


  (Ecotrin Tab)  81 mg  QAM


 PO  3/21/18 09:00


 4/20/18 08:59  3/27/18 09:27


81 MG


 


 Gabapentin


  (Neurontin Cap)  300 mg  TID


 PO  3/20/18 21:00


 4/19/18 20:59  3/27/18 13:13


300 MG


 


 Oxycodone HCl


  (Roxicodone


 Immediate Rel Tab)  10 mg  Q4H  PRN


 PO  3/20/18 13:45


 4/3/18 13:44  3/24/18 05:22


10 MG


 


 Pantoprazole


 Sodium


  (Protonix Tab)  40 mg  QAM


 PO  3/21/18 09:00


 4/20/18 08:59  3/27/18 09:28


40 MG


 


 Phenytoin Sodium


  (Dilantin Er Cap)  300 mg  QPM


 PO  3/20/18 21:00


 4/19/18 20:59  3/26/18 21:36


300 MG


 


 Sertraline HCl


  (Zoloft Tab)  50 mg  HS


 PO  3/20/18 21:00


 4/19/18 20:59  3/26/18 21:36


50 MG


 


 Tamsulosin HCl


  (Flomax Cap)  0.4 mg  HS


 PO  3/20/18 21:00


 4/19/18 20:59  3/26/18 21:36


0.4 MG


 


 Timolol Maleate


  (Timoptic 0.25%


 Oph Soln)  1 drops  QAM


 OPR  3/21/18 09:00


 4/20/18 08:59  3/27/18 09:27


1 DROPS


 


 Pneumococcal


 Polysaccharide


 Vaccine


  (Pneumovax-23


 Inj)  25 mcg  ONCE ONCE


 IM.  3/20/18 15:15


 3/20/18 15:16 DC 3/21/18 00:53


25 MCG


 


 Potassium


 Chloride/Sodium


 Chloride  1,000 ml @ 


 75 mls/hr  [id number]


 IV  3/21/18 10:15


 3/22/18 09:36 DC 3/22/18 02:24


125 MLS/HR


 


 Oxycodone/


 Acetaminophen


  (Percocet


 5-325mg Tab)  1 tab  Q4H  PRN


 PO  3/21/18 11:00


 3/24/18 12:11 DC 3/23/18 13:23


1 TAB


 


 Lidocaine HCl


  (Xylocaine Jelly


 2%)  1 APPLICATION


 PER URETHRA


 FOR URIN...  PRN  PRN


 EXT  3/21/18 15:30


 4/20/18 15:29  3/21/18 15:57


1 ML


 


 Calcium Carbonate


  (Tums Chew Tab)  500 mg  Q6H  PRN


 PO  3/21/18 18:30


 4/20/18 18:29  3/21/18 21:20


500 MG


 


 Senna


  (Senokot Tab)  17.2 mg  QAM


 PO  3/22/18 09:00


 4/21/18 08:59  3/25/18 09:21


17.2 MG


 


 Senna


  (Senokot Tab)  17.2 mg  1930  ONCE


 PO  3/21/18 19:30


 3/21/18 19:31 DC 3/21/18 21:18


17.2 MG


 


 Polyethylene


  (Miralax Powder


 Packet)  17 gm  DAILY  PRN


 PO  3/21/18 19:30


 4/20/18 19:29  3/23/18 15:37


17 GM


 


 Heparin Sodium


  (Porcine)


  (Heparin Sq 5000


 Unit/0.5ml)  5,000 unit  Q12


 SQ  3/22/18 12:30


 4/21/18 12:29  3/27/18 09:36


5,000 UNIT


 


 Sodium Chloride  1,000 ml @ 


 75 mls/hr  [id number]


 IV  3/22/18 09:45


 3/24/18 16:54 DC 3/24/18 13:19


75 MLS/HR


 


 Phytonadione 10


 mg/Sodium Chloride  51 ml @ 


 102 mls/hr  ONE  ONCE


 IV  3/23/18 16:00


 3/23/18 16:29 DC 3/23/18 16:14


102 MLS/HR


 


 Insulin Glargine


  (Lantus Solostar


 Pen)  10 units  HS


 SC  3/23/18 21:00


 4/22/18 20:59  3/26/18 21:39


10 UNITS


 


 Oxycodone/


 Acetaminophen


  (Percocet


 5-325mg Tab)   @   Q4H  PRN


 PO  3/24/18 12:15


 4/4/18 10:59  3/27/18 13:43


1 TAB


 


 Sodium Chloride  1,000 ml @ 


 80 mls/hr  [id number]


 IV  3/24/18 22:00


 3/25/18 12:46 DC 3/25/18 10:41


80 MLS/HR


 


 Furosemide


  (Lasix Tab)  20 mg  NOW  STAT


 PO  3/25/18 13:08


 3/25/18 13:09 DC 3/25/18 13:29


20 MG


 


 Enteral


 Nutritional


 Formula


  (Boost Glucose


 Control)  1 can  BIDM


 PO  3/25/18 17:45


 4/24/18 17:44  3/27/18 08:30


1 CAN


 


 Furosemide


  (Lasix Tab)  20 mg  NOW  ONCE


 PO  3/27/18 15:15


 3/27/18 15:16 DC 3/27/18 15:33


20 MG








 (Sarah Mcclendon, JOSE EDUARDO)





Objective


Vital Signs











  Date Time  Temp Pulse Resp B/P (MAP) Pulse Ox O2 Delivery O2 Flow Rate FiO2


 


3/27/18 16:07      Room Air  


 


3/27/18 15:30 37.2 107 18 140/76 (97) 94 Room Air  


 


3/27/18 07:37 37.4 105 16 123/72 (89) 91 Room Air  


 


3/27/18 07:28      Room Air  


 


3/27/18 03:30 36.9 109 16 126/77 (93) 91 Room Air  


 


3/26/18 23:45      Room Air  


 


3/26/18 22:48 36.9 105 16 127/77 (94) 95 Room Air  


 


3/26/18 19:56 36.4       


 


3/26/18 19:55 37.6 100      








 (Sarah Mcclendon CRNP)





Physical Exam


Notes:


General: no distress


Eyes: normal inspection, PERLL


Respiratory: chest non tender, clear to auscultation, normal breath sounds, no 

respiratory distress, no accessory muscle use


Cardiac: regular rate and rhythm, no rub or gallop, no murmur, no edema, no jvd


GI/: active bowel sounds,abdomen tender, soft, distended


Extremities: normal range of motion, normal strength, non tender 


Neuro/Psych: alert and oriented x 3, normal mood and affect


Skin: normal color, dry, staples intact, incisions well approximated, no 

drainage


 (Sarah Mcclendon CRNP)





Laboratory Results





Last 24 Hours








Test


  3/26/18


16:49 3/26/18


20:34 3/27/18


06:59 3/27/18


08:27


 


Bedside Glucose 145 mg/dl  166 mg/dl   151 mg/dl 


 


Sodium Level   132 mmol/L  


 


Potassium Level   3.5 mmol/L  


 


Chloride Level   98 mmol/L  


 


Carbon Dioxide Level   27 mmol/L  


 


Anion Gap   8.0 mmol/L  


 


Blood Urea Nitrogen   6 mg/dl  


 


Creatinine   0.42 mg/dl  


 


Est Creatinine Clear Calc


Drug Dose 


  


  201.3 ml/min 


  


 


 


Estimated GFR (


American) 


  


  > 150.0 


  


 


 


Estimated GFR (Non-


American 


  


  134.8 


  


 


 


BUN/Creatinine Ratio   13.4  


 


Random Glucose   145 mg/dl  


 


Calcium Level   7.9 mg/dl  


 


Test


  3/27/18


12:06 


  


  


 


 


Bedside Glucose 234 mg/dl    








 (Sarah Mcclendon CRNP)





Assessment and Plan


Mr. Beckham is a 53 y/o male with PMHx of T2DM with Peripheral Neuropathy and 

CVA with L Residual Deficits who is S/P Open Retroperitoneal Mass Bx, 

Excisional Bx of Soft Tissue of L Lumbar Mass, and A Port in L Jugular. 

Hospitalist consulted for tachycardia and weakness.





Sinus Tachycardia: 


- EKG reviewed that shows sinus tachy without ischemic changes or other 

worrisome changes - tachycardia seems likely due to pain at this point


- Continue oxycodone


- mildly hyponatremic but otherwise electrolytes wnl


- CT negative for PE


- procalcitonin wnl, UC/ BC no growth - no longer febrile, fever resolved last 

night on its own - likely due to the lymphoma





Hyponatremia - resolving


- likely due to dehydration - IVF discontinued - patient is up 9L - Na 133 

today 


- given 20 mg po lasix 





Elevated LFTs


- appears chronic - continues to trend down


- INR elevated at 1.9 on admission - given IV vit K 3/23- now wnl - possible 

due to poor po intake?


- hepatitis panel negative, CHAPIS negative 


- consult GI - feel this is part of the lymphoma process - monitor outpatient 





Anemia


- serum iron low, ferritin high - likely acute phase reactant





Urinary Retention with BPH:


- KUB - SBO vs ileus - no nausea or vomiting - no intervention at this time


- discontinued bautista - patient voiding


-  UC showed no growth


- Continue Flomax 0.4 mg HS





T2DM with Peripheral Neuropathy:


- bsgs improved with tightened ss, continue bsgs AC& HS 


- continue Gabapentin 300 mg TID





CVA with L Residual Deficits/history of seizure


- ASA 81 mg daily; Will defer Plavix 75 mg daily to primary team when they 

would like to continued


- Last seizure 2012 - serum Dilantin a bit low however as seizure history is 

remote, will leave dose as it is and patient can follow up outpatient - 

continue Dilantin 300 mg daily and Zoloft 50 mg HS





Dispo - inpatient rehab


 (Sarah Mcclendon ., JOSE EDUARDO)


Attending Attestation - 


Pt seen/examined, chart reviewed, care plan d/w JOSE EDUARDO Mcclendon.


I agree w/ the law components of her documentation.





Pt sitting in chair during my visit.


Eating grilled cheese sandwich.


father at bedside.





only complaint is that of pain over his incisions


apparently was incontinent of stool sometime earlier today





afebrile overnight


VSS


minimal tachycardia 





gen - looks good, NAD


mouth - MMM


neck - no JVD


heart - tachy, s1, s2


lungs - decreased BS bases, o/w CTA b/l 


abd - softly distended, BS+, incisional tenderness only 


ext - no edema


neuro - left sided hemiplegia 


skin - incisions clean on abdominal wall, staples intact





labs - 


Na 132


CBC stable


Cr stable


all cultures negative 





A/P:


1.  low-grade fevers - resolved w/o antibiotics.  Urine cx neg.  Blood cx's 

neg.  I am not convinced he has pneumonia; no symptoms of such.  Suspect fever 

2nd to lymphoma. 


2.  probable depression - consider antidepressant, but according to Ms. Mcclendon he declined one when they had a discussion about it. 


3.  vit K def w/ coagulopathy - resolved.


4.  abnormal LFTs - GI consult appreciated - thought to be due to lymphoma; 

check LFTs every 2-3 days for stability. 


5.  hyponatremia - mild - stable, daily BMP.


6.  newly dx Hodgkin's lymphoma - bone marrow bx results pending; will need 

outpatient placement of a port with subsequent chemo.


7.  severe hypoalbuminemia - due to severe protein calorie malnutrition.  Agree 

with boost supplementation.  Pt will third space quickly with the low albumin.  

Another dose of lasix today due to volume overload state. 


8.  FEN - BMP acceptable; eating is improved. 


9.  tachycardia - extensive w/u negative --- echo wnl as well.  Part of a SIRS 

response to his lymphoma ??





PT, OT both recommending rehab - referral made to [facility], awaiting 

determination 


father updated at bedside today 





Kirk White MD


 (Kirk White MD)

## 2018-03-27 NOTE — HEMATOLOGY/ONCOLOGY PROG NOTE
Hematology/Onc Progress Note


Date of Service


Mar 27, 2018.





Diagnoses


Hodgkin's lymphoma





Medications





Medications Administered








 Medications


  (Trade)  Dose


 Ordered  Sig/Dominic


 Route  Start Time


 Stop Time Status Last Admin


Dose Admin


 


 Lactated Ringer's  1,000 ml @ 


 15 mls/hr  Q24H


 IV  3/20/18 06:00


 3/20/18 14:09 DC 3/20/18 08:38


15 MLS/HR


 


 Fentanyl Citrate


  (Fentanyl Inj)  50 mcg  Q5M  PRN


 IV  3/20/18 09:00


 3/20/18 16:00 DC 3/20/18 14:08


50 MCG


 


 Lidocaine HCl


  (Xylocaine 1%


 Inj (Local))  40 ml  STK-MED ONCE


 .ROUTE  3/20/18 09:57


 3/20/18 09:58 DC 3/20/18 12:59


20 ML


 


 Heparin Sodium


  (Porcine)


  (Heparin 100


 Unit/ml 5ml Flush)  25 ml  STK-MED ONCE


 .ROUTE  3/20/18 09:57


 3/20/18 09:58 DC 3/20/18 12:58


5 ML


 


 Bacitracin


  (Bacitracin Inj)  50,000 units  STK-MED ONCE


 .ROUTE  3/20/18 09:57


 3/20/18 09:58 DC 3/20/18 11:07


50,000 UNITS


 


 Miscellaneous


  (Surgicel Absorb


 Hemostat 2in X


 14in)  1 ea  ONE  ONCE


 TOP  3/20/18 11:42


 3/20/18 11:47 DC 3/20/18 11:47


1 EA


 


 Lactated Ringer's  1,000 ml @ 


 125 mls/hr  Q8H


 IV  3/20/18 15:15


 3/21/18 09:47 DC 3/21/18 05:50


75 MLS/HR


 


 Morphine Sulfate


  (MoRPHine


 SULFATE INJ)  4 mg  Q1H  PRN


 IV  3/20/18 13:45


 4/3/18 13:44  3/23/18 23:46


4 MG


 


 Insulin Aspart


  (novoLOG ASPART)  **SLIDING


 SCALE**


 **If C...  ACHS


 SC  3/20/18 16:00


 4/19/18 15:59  3/27/18 08:00


6 UNITS


 


 Aspirin


  (Ecotrin Tab)  81 mg  QAM


 PO  3/21/18 09:00


 4/20/18 08:59  3/27/18 09:27


81 MG


 


 Gabapentin


  (Neurontin Cap)  300 mg  TID


 PO  3/20/18 21:00


 4/19/18 20:59  3/27/18 09:28


300 MG


 


 Oxycodone HCl


  (Roxicodone


 Immediate Rel Tab)  10 mg  Q4H  PRN


 PO  3/20/18 13:45


 4/3/18 13:44  3/24/18 05:22


10 MG


 


 Pantoprazole


 Sodium


  (Protonix Tab)  40 mg  QAM


 PO  3/21/18 09:00


 4/20/18 08:59  3/27/18 09:28


40 MG


 


 Phenytoin Sodium


  (Dilantin Er Cap)  300 mg  QPM


 PO  3/20/18 21:00


 4/19/18 20:59  3/26/18 21:36


300 MG


 


 Sertraline HCl


  (Zoloft Tab)  50 mg  HS


 PO  3/20/18 21:00


 4/19/18 20:59  3/26/18 21:36


50 MG


 


 Tamsulosin HCl


  (Flomax Cap)  0.4 mg  HS


 PO  3/20/18 21:00


 4/19/18 20:59  3/26/18 21:36


0.4 MG


 


 Timolol Maleate


  (Timoptic 0.25%


 Oph Soln)  1 drops  QAM


 OPR  3/21/18 09:00


 4/20/18 08:59  3/27/18 09:27


1 DROPS


 


 Pneumococcal


 Polysaccharide


 Vaccine


  (Pneumovax-23


 Inj)  25 mcg  ONCE ONCE


 IM.  3/20/18 15:15


 3/20/18 15:16 DC 3/21/18 00:53


25 MCG


 


 Potassium


 Chloride/Sodium


 Chloride  1,000 ml @ 


 75 mls/hr  [id number]


 IV  3/21/18 10:15


 3/22/18 09:36 DC 3/22/18 02:24


125 MLS/HR


 


 Oxycodone/


 Acetaminophen


  (Percocet


 5-325mg Tab)  1 tab  Q4H  PRN


 PO  3/21/18 11:00


 3/24/18 12:11 DC 3/23/18 13:23


1 TAB


 


 Lidocaine HCl


  (Xylocaine Jelly


 2%)  1 APPLICATION


 PER URETHRA


 FOR URIN...  PRN  PRN


 EXT  3/21/18 15:30


 4/20/18 15:29  3/21/18 15:57


1 ML


 


 Calcium Carbonate


  (Tums Chew Tab)  500 mg  Q6H  PRN


 PO  3/21/18 18:30


 4/20/18 18:29  3/21/18 21:20


500 MG


 


 Senna


  (Senokot Tab)  17.2 mg  QAM


 PO  3/22/18 09:00


 4/21/18 08:59  3/25/18 09:21


17.2 MG


 


 Senna


  (Senokot Tab)  17.2 mg  1930  ONCE


 PO  3/21/18 19:30


 3/21/18 19:31 DC 3/21/18 21:18


17.2 MG


 


 Polyethylene


  (Miralax Powder


 Packet)  17 gm  DAILY  PRN


 PO  3/21/18 19:30


 4/20/18 19:29  3/23/18 15:37


17 GM


 


 Heparin Sodium


  (Porcine)


  (Heparin Sq 5000


 Unit/0.5ml)  5,000 unit  Q12


 SQ  3/22/18 12:30


 4/21/18 12:29  3/27/18 09:36


5,000 UNIT


 


 Sodium Chloride  1,000 ml @ 


 75 mls/hr  [id number]


 IV  3/22/18 09:45


 3/24/18 16:54 DC 3/24/18 13:19


75 MLS/HR


 


 Phytonadione 10


 mg/Sodium Chloride  51 ml @ 


 102 mls/hr  ONE  ONCE


 IV  3/23/18 16:00


 3/23/18 16:29 DC 3/23/18 16:14


102 MLS/HR


 


 Insulin Glargine


  (Lantus Solostar


 Pen)  10 units  HS


 SC  3/23/18 21:00


 4/22/18 20:59  3/26/18 21:39


10 UNITS


 


 Oxycodone/


 Acetaminophen


  (Percocet


 5-325mg Tab)   @   Q4H  PRN


 PO  3/24/18 12:15


 4/4/18 10:59  3/27/18 07:33


2 TAB


 


 Sodium Chloride  1,000 ml @ 


 80 mls/hr  [id number]


 IV  3/24/18 22:00


 3/25/18 12:46 DC 3/25/18 10:41


80 MLS/HR


 


 Furosemide


  (Lasix Tab)  20 mg  NOW  STAT


 PO  3/25/18 13:08


 3/25/18 13:09 DC 3/25/18 13:29


20 MG


 


 Enteral


 Nutritional


 Formula


  (Boost Glucose


 Control)  1 can  BIDM


 PO  3/25/18 17:45


 4/24/18 17:44  3/27/18 08:30


1 CAN











Subjective


Seems to be doing fairly well.  Echocardiogram is acceptable.  Bone marrow 

results will be available in the next few days.  His family tells me that he 

will be going to rehab.  He offers no new complaint


Review of Systems:


He offers no new complaints





Vital Signs





Vital Signs Past 12 Hours








  Date Time  Temp Pulse Resp B/P (MAP) Pulse Ox O2 Delivery O2 Flow Rate FiO2


 


3/27/18 07:37 37.4 105 16 123/72 (89) 91 Room Air  


 


3/27/18 07:28      Room Air  


 


3/27/18 03:30 36.9 109 16 126/77 (93) 91 Room Air  


 


3/26/18 23:45      Room Air  











Laboratory





Last 24 Hours








Test


  3/26/18


12:01 3/26/18


16:49 3/26/18


20:34 3/27/18


06:59


 


Bedside Glucose 208 mg/dl  145 mg/dl  166 mg/dl  


 


Sodium Level    132 mmol/L 


 


Potassium Level    3.5 mmol/L 


 


Chloride Level    98 mmol/L 


 


Carbon Dioxide Level    27 mmol/L 


 


Anion Gap    8.0 mmol/L 


 


Blood Urea Nitrogen    6 mg/dl 


 


Creatinine    0.42 mg/dl 


 


Est Creatinine Clear Calc


Drug Dose 


  


  


  201.3 ml/min 


 


 


Estimated GFR (


American) 


  


  


  > 150.0 


 


 


Estimated GFR (Non-


American 


  


  


  134.8 


 


 


BUN/Creatinine Ratio    13.4 


 


Random Glucose    145 mg/dl 


 


Calcium Level    7.9 mg/dl 


 


Test


  3/27/18


08:27 


  


  


 


 


Bedside Glucose 151 mg/dl    











Assessment & Plan


At least stage III Hodgkin's lymphoma





He is doing well.  Echocardiogram results are acceptable for ventral therapy.  

He will have a follow-up in our clinic to begin therapy early to mid next week.

  Marrow results should be available by then.

## 2018-03-28 VITALS
SYSTOLIC BLOOD PRESSURE: 124 MMHG | HEART RATE: 112 BPM | TEMPERATURE: 98.78 F | DIASTOLIC BLOOD PRESSURE: 74 MMHG | OXYGEN SATURATION: 93 %

## 2018-03-28 VITALS
HEART RATE: 112 BPM | OXYGEN SATURATION: 96 % | SYSTOLIC BLOOD PRESSURE: 136 MMHG | DIASTOLIC BLOOD PRESSURE: 83 MMHG | TEMPERATURE: 100.4 F

## 2018-03-28 VITALS
TEMPERATURE: 97.52 F | SYSTOLIC BLOOD PRESSURE: 136 MMHG | DIASTOLIC BLOOD PRESSURE: 83 MMHG | OXYGEN SATURATION: 96 % | HEART RATE: 105 BPM

## 2018-03-28 VITALS — OXYGEN SATURATION: 93 %

## 2018-03-28 VITALS — TEMPERATURE: 97.88 F

## 2018-03-28 LAB
EOSINOPHIL NFR BLD AUTO: 251 K/UL (ref 130–400)
HCT VFR BLD CALC: 30.7 % (ref 42–52)
HGB BLD-MCNC: 10 G/DL (ref 14–18)
MCH RBC QN AUTO: 26.5 PG (ref 25–34)
MCHC RBC AUTO-ENTMCNC: 32.6 G/DL (ref 32–36)
MCV RBC AUTO: 81.4 FL (ref 80–100)
PMV BLD AUTO: 9.1 FL (ref 7.4–10.4)
RED CELL DISTRIBUTION WIDTH CV: 15.3 % (ref 11.5–14.5)
RED CELL DISTRIBUTION WIDTH SD: 45 FL (ref 36.4–46.3)
WBC # BLD AUTO: 5.07 K/UL (ref 4.8–10.8)

## 2018-03-28 RX ADMIN — PANTOPRAZOLE SCH MG: 40 TABLET, DELAYED RELEASE ORAL at 09:31

## 2018-03-28 RX ADMIN — Medication SCH MG: at 09:31

## 2018-03-28 RX ADMIN — HEPARIN SODIUM SCH UNIT: 10000 INJECTION, SOLUTION INTRAVENOUS; SUBCUTANEOUS at 21:00

## 2018-03-28 RX ADMIN — EXTENDED PHENYTOIN SODIUM SCH MG: 100 CAPSULE ORAL at 21:00

## 2018-03-28 RX ADMIN — SERTRALINE HYDROCHLORIDE SCH MG: 50 TABLET, FILM COATED ORAL at 21:00

## 2018-03-28 RX ADMIN — INSULIN ASPART SCH UNITS: 100 INJECTION, SOLUTION INTRAVENOUS; SUBCUTANEOUS at 21:00

## 2018-03-28 RX ADMIN — TAMSULOSIN HYDROCHLORIDE SCH MG: 0.4 CAPSULE ORAL at 21:00

## 2018-03-28 RX ADMIN — HEPARIN SODIUM SCH UNIT: 10000 INJECTION, SOLUTION INTRAVENOUS; SUBCUTANEOUS at 09:00

## 2018-03-28 RX ADMIN — INSULIN ASPART SCH UNITS: 100 INJECTION, SOLUTION INTRAVENOUS; SUBCUTANEOUS at 12:00

## 2018-03-28 RX ADMIN — OXYCODONE HYDROCHLORIDE AND ACETAMINOPHEN PRN TAB: 5; 325 TABLET ORAL at 04:19

## 2018-03-28 RX ADMIN — INSULIN ASPART SCH UNITS: 100 INJECTION, SOLUTION INTRAVENOUS; SUBCUTANEOUS at 08:00

## 2018-03-28 RX ADMIN — Medication SCH CAN: at 17:45

## 2018-03-28 RX ADMIN — OXYCODONE HYDROCHLORIDE AND ACETAMINOPHEN PRN TAB: 5; 325 TABLET ORAL at 18:30

## 2018-03-28 RX ADMIN — GABAPENTIN SCH MG: 300 CAPSULE ORAL at 13:31

## 2018-03-28 RX ADMIN — INSULIN ASPART SCH UNITS: 100 INJECTION, SOLUTION INTRAVENOUS; SUBCUTANEOUS at 17:15

## 2018-03-28 RX ADMIN — Medication SCH CAN: at 08:30

## 2018-03-28 RX ADMIN — GABAPENTIN SCH MG: 300 CAPSULE ORAL at 09:31

## 2018-03-28 RX ADMIN — GABAPENTIN SCH MG: 300 CAPSULE ORAL at 21:00

## 2018-03-28 RX ADMIN — TIMOLOL MALEATE SCH DROPS: 5 SOLUTION OPHTHALMIC at 09:31

## 2018-03-28 RX ADMIN — STANDARDIZED SENNA CONCENTRATE SCH MG: 8.6 TABLET ORAL at 09:00

## 2018-03-28 NOTE — SURGERY PROGRESS NOTE
Surgery Progress Note


Date of Service


Mar 28, 2018.





Subjective


tolerating diet, passing flatus- no complaints this am





Objective


Vital Signs:











  Date Time  Temp Pulse Resp B/P (MAP) Pulse Ox O2 Delivery O2 Flow Rate FiO2


 


3/28/18 07:07      Room Air 2.0 


 


3/27/18 23:35      Room Air  


 


3/27/18 23:33 36.8 112 17 116/72 (87) 93 Room Air  


 


3/27/18 16:07      Room Air  


 


3/27/18 15:30 37.2 107 18 140/76 (97) 94 Room Air  








General Appearance:  no apparent distress


Respiratory/Chest:  no respiratory distress


Abdomen:  soft (mild distention)


Incision(s):  intact


Laboratory Results:





Results Past 24 Hours








Test


  3/27/18


08:27 3/27/18


12:06 3/27/18


17:17 3/27/18


20:13 Range/Units


 


 


Bedside Glucose 151 234 116 216 70-99  mg/dl


 


Test


  3/28/18


06:12 


  


  


  Range/Units


 


 


White Blood Count 5.07    4.8-10.8  K/uL


 


Red Blood Count 3.77    4.7-6.1  M/uL


 


Hemoglobin 10.0    14.0-18.0  g/dL


 


Hematocrit 30.7    42-52  %


 


Mean Corpuscular Volume 81.4      fL


 


Mean Corpuscular Hemoglobin 26.5    25-34  pg


 


Mean Corpuscular Hemoglobin


Concent 32.6


  


  


  


  32-36  g/dl


 


 


RDW Standard Deviation 45.0    36.4-46.3  fL


 


RDW Coefficient of Variation 15.3    11.5-14.5  %


 


Platelet Count 251    130-400  K/uL


 


Mean Platelet Volume 9.1    7.4-10.4  fL











Assessment & Plan


3/28/18- overall positive slow progress


   [facility]


   f/u in surgical office

## 2018-03-28 NOTE — HOSPITALIST PROGRESS NOTE
Hospitalist Progress Note


Date of Service


Mar 28, 2018.


 (Elma Cloud ., JOAQUINA)





Subjective


Pt evaluation today including:  conversation w/ patient, physical exam, lab 

review, review of studies, review of inpatient medication list


Voiding:  no voiding problems


Patient sitting in bedside chair eating lunch. 


Flat affect. Limited conversation. 


States eating and drinking OK. 


Mild abdominal pain around incision sites. Controlled w/ PRN medication. 





Patient denies any fever, chills, sweats, lightheadedness, dizziness, vision 

changes, CP, palpitations, edema, SOB, wheezing, cough, nausea, vomiting, 

diarrhea, urinary symptoms, melena, numbness/tingling, weakness, muscle/joint 

pain, anxiety/depression, active bleeding, or new skin discoloration/changes. 


 (Elma Cloud, PA-C)





Medications





Current Inpatient Medications








 Medications


  (Trade)  Dose


 Ordered  Sig/Dominic


 Route  Start Time


 Stop Time Status Last Admin


Dose Admin


 


 Ondansetron HCl


  (Zofran Inj)  4 mg  Q4H  PRN


 IV  3/20/18 13:45


 4/19/18 13:44   


 


 


 Morphine Sulfate


  (MoRPHine


 SULFATE INJ)  4 mg  Q1H  PRN


 IV  3/20/18 13:45


 4/3/18 13:44  3/23/18 23:46


4 MG


 


 Insulin Aspart


  (novoLOG ASPART)  **SLIDING


 SCALE**


 **If C...  ACHS


 SC  3/20/18 16:00


 4/19/18 15:59  3/28/18 12:00


6 UNITS


 


 Glucose


  (Glucose 40% Gel)  15-30


 GRAMS 15


 GRAMS...  UD  PRN


 PO  3/20/18 13:45


 4/19/18 13:44   


 


 


 Glucose


  (Glucose Chew


 Tab)  4-8


 Tablets 4


 Tabl...  UD  PRN


 PO  3/20/18 13:45


 4/19/18 13:44   


 


 


 Dextrose


  (Dextrose 50%


 50ML Syringe)  25-50ML OF


 50% DW IV


 FOR...  UD  PRN


 IV  3/20/18 13:45


 4/19/18 13:44   


 


 


 Glucagon


  (Glucagon Inj)  1 mg  UD  PRN


 SQ  3/20/18 13:45


 4/19/18 13:44   


 


 


 Aspirin


  (Ecotrin Tab)  81 mg  QAM


 PO  3/21/18 09:00


 4/20/18 08:59  3/28/18 09:31


81 MG


 


 Gabapentin


  (Neurontin Cap)  300 mg  TID


 PO  3/20/18 21:00


 4/19/18 20:59  3/28/18 13:31


300 MG


 


 Oxycodone HCl


  (Roxicodone


 Immediate Rel Tab)  10 mg  Q4H  PRN


 PO  3/20/18 13:45


 4/3/18 13:44  3/24/18 05:22


10 MG


 


 Pantoprazole


 Sodium


  (Protonix Tab)  40 mg  QAM


 PO  3/21/18 09:00


 4/20/18 08:59  3/28/18 09:31


40 MG


 


 Phenytoin Sodium


  (Dilantin Er Cap)  300 mg  QPM


 PO  3/20/18 21:00


 4/19/18 20:59  3/27/18 20:31


300 MG


 


 Sertraline HCl


  (Zoloft Tab)  50 mg  HS


 PO  3/20/18 21:00


 4/19/18 20:59  3/27/18 20:31


50 MG


 


 Tamsulosin HCl


  (Flomax Cap)  0.4 mg  HS


 PO  3/20/18 21:00


 4/19/18 20:59  3/27/18 20:31


0.4 MG


 


 Timolol Maleate


  (Timoptic 0.25%


 Oph Soln)  1 drops  QAM


 OPR  3/21/18 09:00


 4/20/18 08:59  3/28/18 09:31


1 DROPS


 


 Miscellaneous


  (Iv Fluids


 Completed)  1 ea  PRN  PRN


 N/A  3/20/18 15:15


 3/20/19 15:14   


 


 


 Lidocaine HCl


  (Xylocaine Jelly


 2%)  1 APPLICATION


 PER URETHRA


 FOR URIN...  PRN  PRN


 EXT  3/21/18 15:30


 4/20/18 15:29  3/21/18 15:57


1 ML


 


 Al Hydrox/Mg


 Hydrox/Simethicone


  (Maalox Max Susp)  15 ml  Q4H  PRN


 PO  3/21/18 18:30


 4/20/18 18:29   


 


 


 Calcium Carbonate


  (Tums Chew Tab)  500 mg  Q6H  PRN


 PO  3/21/18 18:30


 4/20/18 18:29  3/21/18 21:20


500 MG


 


 Senna


  (Senokot Tab)  17.2 mg  QAM


 PO  3/22/18 09:00


 4/21/18 08:59  3/25/18 09:21


17.2 MG


 


 Bisacodyl


  (Dulcolax Supp)  10 mg  DAILY  PRN


 HI  3/21/18 19:30


 4/20/18 19:29   


 


 


 Polyethylene


  (Miralax Powder


 Packet)  17 gm  DAILY  PRN


 PO  3/21/18 19:30


 4/20/18 19:29  3/23/18 15:37


17 GM


 


 Heparin Sodium


  (Porcine)


  (Heparin Sq 5000


 Unit/0.5ml)  5,000 unit  Q12


 SQ  3/22/18 12:30


 4/21/18 12:29  3/28/18 09:00


5,000 UNIT


 


 Insulin Glargine


  (Lantus Solostar


 Pen)  10 units  HS


 SC  3/23/18 21:00


 4/22/18 20:59  3/27/18 20:37


10 UNITS


 


 Oxycodone/


 Acetaminophen


  (Percocet


 5-325mg Tab)   @   Q4H  PRN


 PO  3/24/18 12:15


 4/4/18 10:59  3/28/18 04:19


1 TAB


 


 Enteral


 Nutritional


 Formula


  (Boost Glucose


 Control)  1 can  BIDM


 PO  3/25/18 17:45


 4/24/18 17:44  3/28/18 08:30


1 CAN








 (Elma Cloud, PA-C)





Objective


Vital Signs











  Date Time  Temp Pulse Resp B/P (MAP) Pulse Ox O2 Delivery O2 Flow Rate FiO2


 


3/28/18 11:00     93 Room Air  


 


3/28/18 08:03 37.1 112 24 124/74 (91) 93 Room Air  


 


3/28/18 07:07      Room Air 2.0 


 


3/27/18 23:35      Room Air  


 


3/27/18 23:33 36.8 112 17 116/72 (87) 93 Room Air  


 


3/27/18 16:07      Room Air  


 


3/27/18 15:30 37.2 107 18 140/76 (97) 94 Room Air  








 (Elma Cloud, PA-C)





Physical Exam


General Appearance:  no apparent distress


Eyes:  normal inspection, PERRL


ENT:  hearing grossly normal


Neck:  supple


Respiratory/Chest:  lungs clear, no respiratory distress, no accessory muscle 

use


Cardiovascular:  + tachycardia (regular rhythm )


Abdomen:  normal bowel sounds, soft, + tenderness (mild, around incision sites)

, + pertinent finding (incision sites C/D/I)


Extremities:  no pedal edema, no calf tenderness


Neurologic/Psychiatric:  alert, oriented x 3, + pertinent finding (flat affect )


Skin:  normal color, warm/dry, no rash


 (Elma Cloud ., PA-C)





Laboratory Results





Last 24 Hours








Test


  3/27/18


17:17 3/27/18


20:13 3/28/18


06:12 3/28/18


08:24


 


Bedside Glucose 116 mg/dl  216 mg/dl   145 mg/dl 


 


White Blood Count   5.07 K/uL  


 


Red Blood Count   3.77 M/uL  


 


Hemoglobin   10.0 g/dL  


 


Hematocrit   30.7 %  


 


Mean Corpuscular Volume   81.4 fL  


 


Mean Corpuscular Hemoglobin   26.5 pg  


 


Mean Corpuscular Hemoglobin


Concent 


  


  32.6 g/dl 


  


 


 


RDW Standard Deviation   45.0 fL  


 


RDW Coefficient of Variation   15.3 %  


 


Platelet Count   251 K/uL  


 


Mean Platelet Volume   9.1 fL  


 


Test


  3/28/18


12:17 


  


  


 


 


Bedside Glucose 191 mg/dl    








 (Elma Cloud, PA-C)





Assessment and Plan


Mr. Beckham is a 53 y/o male with PMHx of T2DM with Peripheral Neuropathy and 

CVA with L residual deficits who is s/p Open Retroperitoneal Mass Bx, 

Excisional Bx of Soft Tissue of L Lumbar Mass, and A Port in L Jugular. 

Hospitalist consulted for tachycardia and weakness.





Newly diagnosed Hodgkin's lymphoma:


- s/p retroperitoneal biopsy on 3/21 by Dr. Nichole- biopsy w/ Hodgkin 

lymphoma 


- s/p bone biopsy on 3/26 by Dr. Orellana- pathology w/out lymphoma


- ECHO obtained in preparation for chemotherapy 


- Hematology/oncology consulted- f/u outpatient to begin chemotherapy 





Sinus tachycardia, likely secondary to pain vs dehydration vs lymphoma- STABLE: 


- EKG reviewed that shows sinus tachy with/out ischemic changes or other 

worrisome changes


- Continue Oxycodone PRN for pain management 


- CTA negative for PE


- ECHO- preserved EF, grade I diastolic dysfunction 


- Fevers- RESOLVED- no s/s of infection- procalcitonin WNL, UCx negative x2, 

BCx negative 





Hyponatremia, likely due to dehydration- RESOLVING: Treated w/ IVF 





Volume overload secondary to IVF and low albumin: Treated w/ Lasix PRN 





Elevated LFTs, ?secondary to lymphoma- IMPROVING:


- Hepatitis negative; CHAPIS negative


- Liver US- unremarkable 


- GI consulted, appreciate recommendations- likely secondary to lymphoma process

- continue to monitor outpatient 





Vitamin K deficiency w/ coagulopathy- RESOLVED: Treated w/ IV Vitamin K 





Severe hypoalbuminemia due to severe protein calorie malnutrition: Boost BID 





Anemia, likely acute phase reactant:


- Serum iron low, ferritin high


- Follow H&H- STABLE 





Urinary retention w/ BPH- RESOLVED: Continue Flomax 0.4 mg HS





T2DM w/ peripheral neuropathy- hgbA1c 6.6% in 02/2018:


- Outpatient regimen of Metformin held 


- Lantus 10 u HS 


- BSG ACHS and ISS  


- Continue Gabapentin 300 mg TID





CVA with L residual deficits, h/o seizure: 


- ASA 81 mg daily; defer Plavix 75 mg daily to primary team when they would 

like to continued


- Continue Dilantin 300 mg daily and Zoloft 50 mg HS





GERD: Protonix daily 





DVT prophylaxis: Heparin SQ BID 





Code status: LEVEL I, FULL 





Dispo: Stable for discharge pending placement- hoping for [facility]- PT/OT and CM 

following 


 (Elma Cloud, JOAQUINA)





Reviewed:  Pt Seen/Exam by Me


 (Dede Marcos MD)


History


Physician Assistant Supervision Note:





I interviewed and examined the patient. Discussed with TERESA Cloud and agree 

with findings and plan as documented in the note. Any exceptions or 

clarifications are listed here:





Patient had another borderline fever this afternoon.  He denies cough, he is 

not really using his incentive spirometer much.  He was out of bed to chair 

earlier today.  Has some incisional site pain, but otherwise no abdominal pain.

  Denies diarrhea.  Denies urinary symptoms.  Denies headache, shortness of 

breath, chest pain.





Vitals reviewed


Very flat affect, NAD, alert awake and oriented


Regular rhythm with mild tachycardia, no MGR


Lungs clear to auscultation bilaterally


Abdomen +bowel sounds, soft but mildly distended, positive minimal TTP around 

incision sites without guarding or rebound tenderness, staples intact, no 

surrounding erythema or drainage from wounds; left anterior chest wall with A-

port in place, steri strips in place, no erythema, no drainage, no tenderness


Extremities no edema








Patient is a 52-year-old male with a history of seizure disorder, DM 2, 

diabetic peripheral neuropathy, history of CVA with left-sided residual deficits

, here with diffuse bulky lymphadenopathy now status post open retroperitoneal 

mass biopsy and excisional biopsy of soft tissue left lumbar mass as well as a-

port placement.  Hospitalist service consulted for persistent tachycardia and 

weakness.  Also with hyponatremia


Found to have Hodgkin's lymphoma on biopsies, bone marrow negative.


With fever again today, no evidence of infection on exam or by history. Could 

be secondary to lymphoma, atelectasis, UTI.


 Does have A-port in place as well but not accessed.


-check BCxs, UA and Ur cx if indicated now


-encouraged IS, asked RN to get pt OOB to chair again for mobilization


-wounds all look good, no signs of infection, no evidence on exam or by history 

of DVT/PE


-follow cultures, but most likely fevers and sinus tach all secondary to 

lymphoma


-awaiting discharge











Documented By:  Dede Marcos





Documented By:  Dede Marcos


 (Dede Marcos MD)

## 2018-03-29 VITALS — OXYGEN SATURATION: 96 %

## 2018-03-29 VITALS
DIASTOLIC BLOOD PRESSURE: 78 MMHG | HEART RATE: 104 BPM | OXYGEN SATURATION: 94 % | TEMPERATURE: 98.42 F | SYSTOLIC BLOOD PRESSURE: 138 MMHG

## 2018-03-29 VITALS
SYSTOLIC BLOOD PRESSURE: 127 MMHG | DIASTOLIC BLOOD PRESSURE: 76 MMHG | OXYGEN SATURATION: 96 % | HEART RATE: 103 BPM | TEMPERATURE: 100.22 F

## 2018-03-29 VITALS
SYSTOLIC BLOOD PRESSURE: 127 MMHG | TEMPERATURE: 100.22 F | OXYGEN SATURATION: 96 % | HEART RATE: 103 BPM | DIASTOLIC BLOOD PRESSURE: 76 MMHG

## 2018-03-29 LAB
ALBUMIN SERPL-MCNC: 1.5 GM/DL (ref 3.4–5)
ALP SERPL-CCNC: 227 U/L (ref 45–117)
ALT SERPL-CCNC: 97 U/L (ref 12–78)
AST SERPL-CCNC: 102 U/L (ref 15–37)
BUN SERPL-MCNC: 6 MG/DL (ref 7–18)
CALCIUM SERPL-MCNC: 7.9 MG/DL (ref 8.5–10.1)
CO2 SERPL-SCNC: 27 MMOL/L (ref 21–32)
CREAT SERPL-MCNC: 0.25 MG/DL (ref 0.6–1.4)
GLUCOSE SERPL-MCNC: 129 MG/DL (ref 70–99)
HEP C IGG  13 YRS+OLDER_RFLX: (no result)
POTASSIUM SERPL-SCNC: 3.4 MMOL/L (ref 3.5–5.1)
PROT SERPL-MCNC: 5.3 GM/DL (ref 6.4–8.2)
SODIUM SERPL-SCNC: 131 MMOL/L (ref 136–145)

## 2018-03-29 RX ADMIN — TIMOLOL MALEATE SCH DROPS: 5 SOLUTION OPHTHALMIC at 09:34

## 2018-03-29 RX ADMIN — Medication SCH CAN: at 08:30

## 2018-03-29 RX ADMIN — HEPARIN SODIUM SCH UNIT: 10000 INJECTION, SOLUTION INTRAVENOUS; SUBCUTANEOUS at 09:00

## 2018-03-29 RX ADMIN — OXYCODONE HYDROCHLORIDE AND ACETAMINOPHEN PRN TAB: 5; 325 TABLET ORAL at 07:50

## 2018-03-29 RX ADMIN — PANTOPRAZOLE SCH MG: 40 TABLET, DELAYED RELEASE ORAL at 09:35

## 2018-03-29 RX ADMIN — INSULIN ASPART SCH UNITS: 100 INJECTION, SOLUTION INTRAVENOUS; SUBCUTANEOUS at 08:00

## 2018-03-29 RX ADMIN — Medication SCH MG: at 09:34

## 2018-03-29 RX ADMIN — STANDARDIZED SENNA CONCENTRATE SCH MG: 8.6 TABLET ORAL at 09:00

## 2018-03-29 RX ADMIN — GABAPENTIN SCH MG: 300 CAPSULE ORAL at 13:28

## 2018-03-29 RX ADMIN — GABAPENTIN SCH MG: 300 CAPSULE ORAL at 09:35

## 2018-03-29 RX ADMIN — OXYCODONE HYDROCHLORIDE AND ACETAMINOPHEN PRN TAB: 5; 325 TABLET ORAL at 01:53

## 2018-03-29 RX ADMIN — INSULIN ASPART SCH UNITS: 100 INJECTION, SOLUTION INTRAVENOUS; SUBCUTANEOUS at 13:28

## 2018-03-29 NOTE — DISCHARGE INSTRUCTIONS
Discharge Instructions


Date of Service


Mar 29, 2018.





Admission


Reason for Admission:  Retroperitoneal Mass, Neoplasm Of Uncertain





Discharge


Discharge Diagnosis / Problem:  Hodgkin's lymphoma





Discharge Goals


Goal(s):  Decrease discomfort, Improve function, Increase independence, Improve 

disease control, Improve nutritional status, Learn about illness, Diagnostic 

testing, Therapeutic intervention, Prevent Disease Progression





Activity Recommendations


Activity Limitations:  per Instructions/Follow-up section (limitations as per 

general surgery instructions )





.





Instructions / Follow-Up


Instructions / Follow-Up


You were admitted to [facility] for a retroperitoneal biopsy- biopsy positive for 

Hodgkin's lymphoma. A bone biopsy was completed and unremarkable. 


   You may take Oxycodone every 4 hours as needed for pain management 





Resume all other regular home as prescribed 





FOLLOW-UPS:





Please follow-up with your PCP within 5-7 days





Please follow-up with General surgery as instructed by Dr. Nichole





Follow-up with Hematology/Oncology as instructed by Dr. Orellana 





Please follow-up/keep all of your subspecialty appointments





Current Hospital Diet


Patient's current hospital diet: Diabetes Type 2 Diet





Discharge Diet


Recommended Diet:  Diabetes Type 2 Diet





Procedures


Procedures Performed:  


Laparoscopic attempted Biopsy of Retroperitoneal Mass convert to Open; A-Port 


Placement Left Jugular; Excisional Biopsy of 2cm soft tissue mass Lef tLumbar 


Mass





Pending Studies


Studies pending at discharge:  no





Laboratory Results





Hemoglobin A1c








Test


  2/21/18


14:00 Range/Units


 


 


Estimated Average Glucose 143   mg/dl


 


Hemoglobin A1c 6.6 H 4.5-5.6  %








Lipid Panel








Test


  1/10/18


08:30 Range/Units


 


 


Triglycerides Level 168 H 0-150  mg/dl


 


Cholesterol Level 202 H 0-200  mg/dl


 


HDL Cholesterol 40   mg/dl


 


LDL Cholesterol Direct 146   mg/dl


 


Cholesterol/HDL Ratio 5.1   


 


LDL Cholesterol, Calculated    mg/dl











Medical Emergencies








.


Who to Call and When:





Medical Emergencies:  If at any time you feel your situation is an emergency, 

please call 911 immediately.





.





Non-Emergent Contact


Non-Emergency issues call your:  Primary Care Provider, Surgeon, Specialist


Call Non-Emergent contact if:  you have a fever, your pain is not controlled, 

your pain is worsening, your pain is unusual for you, your pain is concerning 

you, wound has increased drainage, wound has increased redness, wound has 

increased pain, you have any medication questions





.


.








"Provider Documentation" section prepared by Elma Cloud.








.

## 2018-03-29 NOTE — DISCHARGE SUMMARY
Discharge Summary


Date of Service


Mar 29, 2018.





Discharge Summary


Admission Date:


Mar 23, 2018 at 10:32


Discharge Date:  Mar 29, 2018


Discharge Disposition:  Home


Principal Diagnosis:  Hodgkin's lymphoma


Problems/Secondary Diagnoses:


Sinus tachycardia


grade I diastolic dysfunction 


fevers 


Hyponatremia


Hypokalemia


Volume overload secondary to IVF and low albumin


Elevated LFTs, ?secondary to lymphoma


Vitamin K deficiency w/ coagulopathy


Severe hypoalbuminemia due to severe protein calorie malnutrition


Anemia, likely acute phase reactant


Urinary retention w/ BPH


T2DM w/ peripheral neuropathy- hgbA1c 6.6% in 02/2018


CVA with L residual deficits


h/o seizure


GERD


Immunizations:  


   Have You Had Influenza Vaccine:  No


   History of Tetanus Vaccine?:  Unknown


   History of Pneumococcal:  No


   History of Hepatitis B Vaccine:  No


Procedures:


CHEST ONE VIEW PORTABLE





CLINICAL HISTORY: 52 years-old Male presenting with port placement. 





TECHNIQUE: Portable upright AP view of the chest was obtained.





COMPARISON: 6/13/2017.





FINDINGS:


Left subclavian Mediport terminates in the right atrium. Cardiac silhouette top


normal in size. Mildly low lung volumes with hypoventilatory changes. Bandlike


opacity in the right mid lung likely atelectasis. No large pleural effusion or


pneumothorax. A loose body may be present in the right glenohumeral joint. Upper


abdomen normal.





IMPRESSION:


1.  Status post left subclavian Mediport placement, which is appropriately


positioned. No pneumothorax.





2.  Mildly low lung volumes with hypoventilatory changes and atelectasis.











Electronically signed by:  Daniel June M.D.


3/20/2018 2:26 PM





Dictated Date/Time:  3/20/2018 2:25 PM





 The status of this report is Signed.   


 Draft = Not yet reviewed or approved by Radiologist.  


 Signed = Reviewed and approved by Radiologist.





KUB





HISTORY: Acute abdominal distention  Abdominal Distention/Pain





COMPARISON: PET CT 3/19/2018





FINDINGS: There is moderate gaseous distention of the large bowel with moderate


stool volume within the right hemicolon. There is relative paucity of small


bowel gas without evidence of small bowel obstruction. Skin staples are noted


overlying the abdominal right upper quadrant. Linear lucency adjacent to the


lesser curvature of the stomach is noted. No pneumatosis. No urolith. Fluid with


of the pelvis. No fracture. Port catheter projects over the right atrium.





IMPRESSION:  





1. Surgical skin staples of the right upper abdomen with linear lucency adjacent


to the lesser curvature of the stomach, possibly reflecting pneumoperitoneum in


the setting of recent surgery.


2. Mild colonic distention suggests colonic ileus without evidence of small


bowel obstruction.











Electronically signed by:  Patrick Shen M.D.


3/21/2018 7:13 PM





Dictated Date/Time:  3/21/2018 7:09 PM





 The status of this report is Signed.   


 Draft = Not yet reviewed or approved by Radiologist.  


 Signed = Reviewed and approved by Radiologist.





GALLBLADDER-ABD LIMITED





CLINICAL HISTORY: 52 years-old Male presenting with elevated lft. 





TECHNIQUE: Real-time grayscale and limited color Doppler ultrasound imaging of


the abdomen limited to the right upper quadrant was performed. 





COMPARISON: CT from 2/22/2018.





FINDINGS:





Pancreas: Largely obscured due to overlying bowel gas and the presence of a


bandage.





Liver: Subtle hypoechogenicity with normal echotexture. The liver measures 16.5


cm in maximal sagittal dimension. No sonographic evidence of hepatic mass. Main


portal vein patent with normal directional flow.





Biliary: No intrahepatic biliary ductal dilatation. Common bile duct measures up


to 5 mm in diameter.





Gallbladder: Gallbladder sludge. No evidence of gallstones, gallbladder wall


thickening, gallbladder distention, or pericholecystic fluid or inflammatory


change.    





Right kidney: The echogenicity of the right kidney is greater than that of the


adjacent liver parenchyma. No hydronephrosis.





Ascites: None.





Other: None.





IMPRESSION: 


1.  Gallbladder sludge. No cholelithiasis, biliary ductal dilatation, or


cholecystitis. Evaluation was limited due to inability to performed decubitus


imaging and poor breath-holding.





2.  Hypoechogenic liver parenchyma could be seen in the setting of hepatitis.











Electronically signed by:  Daniel June M.D.


3/22/2018 8:03 AM





Dictated Date/Time:  3/22/2018 7:59 AM





 The status of this report is Signed.   


 Draft = Not yet reviewed or approved by Radiologist.  


Signed = Reviewed and approved by Radiologist.





(CHEST FOR PE) ANGIO WITH





CT DOSE: 290.31 mGy.cm





HISTORY:  52 years-old Male presents with acute shortness of breath and


tachycardia





TECHNIQUE: Multiple CTA images of the chest were obtained after the intravenous


administration of 102 ml Optiray 320.  Coronal and sagittal MIPS were obtained


from the axial data set and were submitted for review.  A dose lowering


technique was utilized adhering to the principles of ALARA.





COMPARISON: Chest radiograph 3/20/2018, PET CT 3/19/2018, CT chest 2/27/2018.





FINDINGS: 





CTA:  


Mild multichamber cardiac enlargement. Coronary arterial disease. Thoracic aorta


is normal in course and caliber without aneurysm or dissection. Mild mixed


plaquing of the thoracic aorta and proximal great vessels appear patent. The


pulmonary arterial tree demonstrates minimal air within the main segment, likely


secondary to IV catheter. Study is moderately motion degraded which limits


evaluation of the distal lobar, segmental and subsegmental branches of the


pulmonary arterial tree. No definite filling defects identified to suggest


pulmonary thromboembolic disease.


 


CT CHEST:  


No dominant thyroid nodule. Left supraclavicular lymph node measures up to 1.4 x


2.8 cm. Pathologic mediastinal, distal periesophageal and upper abdominal


adenopathy redemonstrated which has not significantly changed from comparison


PET CT 3/19/2018. Subcarinal lymph nodes measure up to 1.4 cm in short axis.


There is esophageal lymph nodes measure up to 1.1 cm in short axis. Retrocrural


lymph nodes on the right measure up to 1.3 cm in short axis.





No large pleural effusion or pneumothorax. Subsegmental dependent consolidative


opacities suggest atelectasis. There is mild bilateral bronchial wall


thickening. Indeterminate pleural-based 4 mm nodule of the apical posterior


segment left upper lobe. Central airways appear patent.





Mild pneumoperitoneum noted adjacent to the liver. Minimal deep tissue air is


seen. As I 4 process, both of these findings are likely postsurgical. Deep


tissue air is noted about a left pectoral Hazlmf-s-Ilby catheter with distal tip


in the SVC. The bones appear intact without suspicious lytic or blastic bony


lesions identified.








IMPRESSION:  


1. Motion degraded exam with limited evaluation of the pulmonary arterial tree


as above. No central embolus or acute aortic pathology identified.


2. Dependent subsegmental bibasilar consolidation suggests atelectasis.


3. Pathologically enlarged left supraclavicular, mediastinal, retrocrural,


periesophageal and upper abdominal adenopathy redemonstrated, further


characterized on comparison PET CT 3/19/2018 suspicious for lymphoproliferative


disorder.


4. Minimal pneumoperitoneum is likely postsurgical. Postsurgical deep tissue air


is also noted about the left pectoral Hixtni-q-Hbyg catheter.


5. Mild cardiomegaly.











The above report was generated using voice recognition software. It may contain


grammatical, syntax or spelling errors.











Electronically signed by:  Patrick Shen M.D.


3/22/2018 3:39 PM





Dictated Date/Time:  3/22/2018 3:29 PM





 The status of this report is Signed.   


 Draft = Not yet reviewed or approved by Radiologist.  


 Signed = Reviewed and approved by Radiologist.








CHEST ONE VIEW PORTABLE





CLINICAL HISTORY: fever, eval for pneumonia; eval for any developing CHF dyspnea





COMPARISON STUDY:  3/20/2018 mild stable cardiomegaly.





FINDINGS: Central catheter remains in the right atrium. No evidence


pneumothorax. Potential minimal parenchymal infiltrate right base. Diaphragms


smooth. Contrast costophrenic angles are sharp. 





IMPRESSION:  Potential minimal parenchymal infiltrate right base. 

















The above report was generated using voice recognition software.  It may contain


grammatical, syntax or spelling errors.














Electronically signed by:  Ben Rojas M.D.


3/24/2018 5:30 PM





Dictated Date/Time:  3/24/2018 5:29 PM





 The status of this report is Signed.   


 Draft = Not yet reviewed or approved by Radiologist.  


 Signed = Reviewed and approved by Radiologist.











CC: Abhishek Nichole M.D.


 


 Endcc:








DICTATED BY:  Abhishek Nichole M.D.


DATE OF OPERATION:  03/20/2018


 


SURGEON:  Abhishek Nichole MD.


 


FIRST ASSISTANT:  Hieu Wei PA-C.


 


PREOPERATIVE DIAGNOSES:  Retroperitoneal mass suspicious for lymphoma, likely


need for chemotherapy, lesion in the left flank approximately 2 cm,


questionable metastatic lymphoma to the skin.


 


PROCEDURE:  Laparoscopy, open biopsy of retroperitoneal mass with frozen


sections, A-port placement in left internal jugular, excision of left flank


mass.


Consultations:


General surgery- Dr. Nichole


Hematology/oncology- Dr. Orellana 


GI- Dr. Hensley





Medication Reconciliation


Continued Medications:  


Aspirin (Aspirin Dr) 81 Mg Tab


81 MG PO QAM


WILL FOLLOW SURGEON INSTRUCTION


Clopidogrel Bisulfate (Plavix) 75 Mg Tab


75 MG PO QAM, TAB


WILL FOLLOW INSTRUCTIONS FROM SURGEON


Gabapentin (Neurontin) 100 Mg Cap


300 MG PO TID, CAP





Glyburide (Glyburide) 1.25 Mg Tab


1.25 MG PO QAM


TAKE THIS MEDICATION WITH BREAKFAST.


Metformin Hcl (Glucophage) 500 Mg Tab


500 MG PO TIDM, TAB





Multiple Vitamins W/ Minerals (Airborne) 1 Tab Tab


1 TAB PO QAM





Oxycodone Ir (Roxicodone Ir) 5 Mg Tab


10 MG PO Q4H PRN for Severe Pain, #20 TAB (This prescription has been renewed)





Pantoprazole Sodium (Protonix) 40 Mg Tab


40 MG PO QAM, TAB





Phenytoin Sodium (Dilantin) 100 Mg Cap


300 MG PO QPM for 30 Days, CAP 2 Refills





Sertraline Hcl (Zoloft) 50 Mg Tab


50 MG PO HS, TAB





Tamsulosin HCl (Tamsulosin HCl) 0.4 Mg Cap


0.4 MG PO HS





[Magnesium] ()  


500 MG PO QPM





[Timolol 0.25%] ()  


1 DROP OPR QAM











Referrals At Discharge


Follow up Referrals:  


Family Practice Referral - Within 1 Week with Douglas Orellana M.D.


Oncology/Hematology Referral - Within 1 Week with Avinash Orellana D.O.


Surgery Referral - Within 1 Week with Abhishek Nichole M.D.








Discharge Exam


Review of Systems:  


   Constitutional:  No fever, No chills, No sweats, No weakness, No fatigue


   Eyes:  No worsening of vision


   ENT:  No hearing loss, No sore throat, No trouble swallowing


   Respiratory:  No cough, No shortness of breath, No hemoptysis


   Cardiovascular:  No chest pain, No edema, No palpitations


   Abdomen:  + pain (around incision sites ), No nausea, No vomiting, No 

diarrhea, No constipation


   Musculoskeletal:  No joint pain, No muscle pain, No calf pain


   Genitourinary - Male:  No hematuria, No dysuria


   Neurologic:  No memory loss, No numbness/tingling


   Psychiatric:  No depression symptoms


   Hematologic / Lymphatic:  No abnormal bleeding/bruising


   Integumentary:  No rash, No itch, No new/changing skin lesions


Physical Exam:  


   General Appearance:  no apparent distress


   Eyes:  normal inspection, PERRL


   ENT:  hearing grossly normal


   Neck:  supple


   Respiratory/Chest:  lungs clear, no respiratory distress, no accessory 

muscle use


   Cardiovascular:  + tachycardia (regular rhythm )


   Abdomen / GI:  normal bowel sounds, non tender, soft, + pertinent finding (

incision sites C/D/I )


   Extremities:  no calf tenderness, no pedal edema


   Neurologic/Psychiatric:  alert, oriented x 3, + pertinent finding (flat 

affect )


   Skin:  normal color, warm/dry, no rash





Hospital Course


Mr. Beckham is a 51 y/o male with PMHx of T2DM with Peripheral Neuropathy and 

CVA with L residual deficits who is s/p Open Retroperitoneal Mass Bx, 

Excisional Bx of Soft Tissue of L Lumbar Mass, and A Port in L Jugular. 

Hospitalist consulted for tachycardia and weakness.





Newly diagnosed Hodgkin's lymphoma:


- s/p retroperitoneal biopsy on 3/21 by Dr. Nichole- biopsy w/ Hodgkin 

lymphoma 


- s/p bone biopsy on 3/26 by Dr. Orellana- pathology w/out lymphoma


- ECHO obtained in preparation for chemotherapy 


- Hematology/oncology consulted- f/u outpatient to begin chemotherapy 





Sinus tachycardia, likely secondary to pain vs dehydration vs lymphoma- STABLE: 


- EKG reviewed that shows sinus tachy with/out ischemic changes or other 

worrisome changes


- Continue Oxycodone PRN for pain management 


- CTA negative for PE


- ECHO- preserved EF, grade I diastolic dysfunction 


- Fevers- RESOLVED- no s/s of infection- procalcitonin WNL, UCx negative x2, 

BCx negative, repeat BCx pending  





Hyponatremia, likely due to dehydration- STABLE: Treated w/ IVF 





Hypokalemia: Replaced w/ 20 mEq KCL supplement 





Volume overload secondary to IVF and low albumin: Treated w/ Lasix PRN 





Elevated LFTs, ?secondary to lymphoma- IMPROVING:


- Hepatitis negative; CHAPIS negative


- Liver US- unremarkable 


- GI consulted, appreciate recommendations- likely secondary to lymphoma process

- continue to monitor outpatient 





Vitamin K deficiency w/ coagulopathy- RESOLVED: Treated w/ IV Vitamin K 





Severe hypoalbuminemia due to severe protein calorie malnutrition: Boost BID 





Anemia, likely acute phase reactant:


- Serum iron low, ferritin high


- Follow H&H- STABLE 





Urinary retention w/ BPH- RESOLVED: Continue Flomax 0.4 mg HS





T2DM w/ peripheral neuropathy- hgbA1c 6.6% in 02/2018:


- Outpatient regimen of Metformin and Glyburide held- resume at discharge  


- Lantus 10 u HS 


- BSG ACHS and ISS  


- Continue Gabapentin 300 mg TID





CVA with L residual deficits, h/o seizure: 


- ASA 81 mg daily, Plavix 75 mg daily 


- Continue Dilantin 300 mg daily and Zoloft 50 mg HS





GERD: Protonix daily 





DVT prophylaxis: Heparin SQ BID 





Code status: LEVEL I, FULL 





Dispo: Discharge to home


Total Time Spent:  Greater than 30 minutes


This includes examination of the patient, discharge planning, medication 

reconciliation, and communication with other providers.





Discharge Instructions


Please refer to the electronic Patient Visit Report (Discharge Instructions) 

for additional information.





Follow-Up


Please follow-up with your PCP within 5-7 days





Please follow-up with Dr. Orellana within 1 week





Follow-up with Dr. Nichole as instructed 





Please follow-up/keep all of your subspecialty appointments





Additional Copies To


Douglas Orellana M.D.





Reviewed:  Pt Seen/Exam by Me


History


Physician Assistant Supervision Note:





I interviewed and examined the patient. Discussed with TERESA Cloud and agree 

with findings and plan as documented in the note. Any exceptions or 

clarifications are listed here:





DOing well, pain controlled, moe po, no complaints. Remains with intermittent 

low grade fevers, but no growth on any cultures, no source, likely secondary to 

lymphoma.





Vitals reviewed


Very flat affect, NAD, alert awake and oriented


Regular rhythm with mild tachycardia, no MGR


Lungs clear to auscultation bilaterally


Abdomen +bowel sounds, soft but mildly distended, positive minimal TTP around 

incision sites without guarding or rebound tenderness, staples intact, no 

surrounding erythema or drainage from wounds; left anterior chest wall with A-

port in place, steri strips in place, no erythema, no drainage, no tenderness


Extremities no edema








Patient is a 52-year-old male with a history of seizure disorder, DM 2, 

diabetic peripheral neuropathy, history of CVA with left-sided residual deficits

, here with diffuse bulky lymphadenopathy now status post open retroperitoneal 

mass biopsy and excisional biopsy of soft tissue left lumbar mass as well as a-

port placement.  Hospitalist service consulted for persistent tachycardia and 

weakness.  Also with hyponatremia


Found to have Hodgkin's lymphoma on biopsies, bone marrow negative.


Fevers likely secondary to lymphoma, atelectasis.


LFTs trending downward, no evidence of infection.


 Stable for discharge to home with close Oncology follow up








Documented By:  Dede Marcos

## 2018-03-29 NOTE — HEMATOLOGY/ONCOLOGY PROG NOTE
Hematology/Onc Progress Note


Date of Service


Mar 29, 2018.





Diagnoses


Hodgkin's lymphoma





Medications





Medications Administered








 Medications


  (Trade)  Dose


 Ordered  Sig/Dominic


 Route  Start Time


 Stop Time Status Last Admin


Dose Admin


 


 Lactated Ringer's  1,000 ml @ 


 15 mls/hr  Q24H


 IV  3/20/18 06:00


 3/20/18 14:09 DC 3/20/18 08:38


15 MLS/HR


 


 Fentanyl Citrate


  (Fentanyl Inj)  50 mcg  Q5M  PRN


 IV  3/20/18 09:00


 3/20/18 16:00 DC 3/20/18 14:08


50 MCG


 


 Lidocaine HCl


  (Xylocaine 1%


 Inj (Local))  40 ml  STK-MED ONCE


 .ROUTE  3/20/18 09:57


 3/20/18 09:58 DC 3/20/18 12:59


20 ML


 


 Heparin Sodium


  (Porcine)


  (Heparin 100


 Unit/ml 5ml Flush)  25 ml  STK-MED ONCE


 .ROUTE  3/20/18 09:57


 3/20/18 09:58 DC 3/20/18 12:58


5 ML


 


 Bacitracin


  (Bacitracin Inj)  50,000 units  STK-MED ONCE


 .ROUTE  3/20/18 09:57


 3/20/18 09:58 DC 3/20/18 11:07


50,000 UNITS


 


 Miscellaneous


  (Surgicel Absorb


 Hemostat 2in X


 14in)  1 ea  ONE  ONCE


 TOP  3/20/18 11:42


 3/20/18 11:47 DC 3/20/18 11:47


1 EA


 


 Lactated Ringer's  1,000 ml @ 


 125 mls/hr  Q8H


 IV  3/20/18 15:15


 3/21/18 09:47 DC 3/21/18 05:50


75 MLS/HR


 


 Morphine Sulfate


  (MoRPHine


 SULFATE INJ)  4 mg  Q1H  PRN


 IV  3/20/18 13:45


 4/3/18 13:44  3/23/18 23:46


4 MG


 


 Insulin Aspart


  (novoLOG ASPART)  **SLIDING


 SCALE**


 **If C...  ACHS


 SC  3/20/18 16:00


 4/19/18 15:59  3/29/18 08:00


6 UNITS


 


 Aspirin


  (Ecotrin Tab)  81 mg  QAM


 PO  3/21/18 09:00


 4/20/18 08:59  3/29/18 09:34


81 MG


 


 Gabapentin


  (Neurontin Cap)  300 mg  TID


 PO  3/20/18 21:00


 4/19/18 20:59  3/29/18 09:35


300 MG


 


 Oxycodone HCl


  (Roxicodone


 Immediate Rel Tab)  10 mg  Q4H  PRN


 PO  3/20/18 13:45


 4/3/18 13:44  3/24/18 05:22


10 MG


 


 Pantoprazole


 Sodium


  (Protonix Tab)  40 mg  QAM


 PO  3/21/18 09:00


 4/20/18 08:59  3/29/18 09:35


40 MG


 


 Phenytoin Sodium


  (Dilantin Er Cap)  300 mg  QPM


 PO  3/20/18 21:00


 4/19/18 20:59  3/27/18 20:31


300 MG


 


 Sertraline HCl


  (Zoloft Tab)  50 mg  HS


 PO  3/20/18 21:00


 4/19/18 20:59  3/27/18 20:31


50 MG


 


 Tamsulosin HCl


  (Flomax Cap)  0.4 mg  HS


 PO  3/20/18 21:00


 4/19/18 20:59  3/27/18 20:31


0.4 MG


 


 Timolol Maleate


  (Timoptic 0.25%


 Oph Soln)  1 drops  QAM


 OPR  3/21/18 09:00


 4/20/18 08:59  3/29/18 09:34


1 DROPS


 


 Pneumococcal


 Polysaccharide


 Vaccine


  (Pneumovax-23


 Inj)  25 mcg  ONCE ONCE


 IM.  3/20/18 15:15


 3/20/18 15:16 DC 3/21/18 00:53


25 MCG


 


 Potassium


 Chloride/Sodium


 Chloride  1,000 ml @ 


 75 mls/hr  [id number]


 IV  3/21/18 10:15


 3/22/18 09:36 DC 3/22/18 02:24


125 MLS/HR


 


 Oxycodone/


 Acetaminophen


  (Percocet


 5-325mg Tab)  1 tab  Q4H  PRN


 PO  3/21/18 11:00


 3/24/18 12:11 DC 3/23/18 13:23


1 TAB


 


 Lidocaine HCl


  (Xylocaine Jelly


 2%)  1 APPLICATION


 PER URETHRA


 FOR URIN...  PRN  PRN


 EXT  3/21/18 15:30


 4/20/18 15:29  3/21/18 15:57


1 ML


 


 Calcium Carbonate


  (Tums Chew Tab)  500 mg  Q6H  PRN


 PO  3/21/18 18:30


 4/20/18 18:29  3/21/18 21:20


500 MG


 


 Senna


  (Senokot Tab)  17.2 mg  QAM


 PO  3/22/18 09:00


 4/21/18 08:59  3/25/18 09:21


17.2 MG


 


 Senna


  (Senokot Tab)  17.2 mg  1930  ONCE


 PO  3/21/18 19:30


 3/21/18 19:31 DC 3/21/18 21:18


17.2 MG


 


 Polyethylene


  (Miralax Powder


 Packet)  17 gm  DAILY  PRN


 PO  3/21/18 19:30


 4/20/18 19:29  3/23/18 15:37


17 GM


 


 Heparin Sodium


  (Porcine)


  (Heparin Sq 5000


 Unit/0.5ml)  5,000 unit  Q12


 SQ  3/22/18 12:30


 4/21/18 12:29  3/29/18 09:00


5,000 UNIT


 


 Sodium Chloride  1,000 ml @ 


 75 mls/hr  [id number]


 IV  3/22/18 09:45


 3/24/18 16:54 DC 3/24/18 13:19


75 MLS/HR


 


 Phytonadione 10


 mg/Sodium Chloride  51 ml @ 


 102 mls/hr  ONE  ONCE


 IV  3/23/18 16:00


 3/23/18 16:29 DC 3/23/18 16:14


102 MLS/HR


 


 Insulin Glargine


  (Lantus Solostar


 Pen)  10 units  HS


 SC  3/23/18 21:00


 3/28/18 16:31 DC 3/27/18 20:37


10 UNITS


 


 Oxycodone/


 Acetaminophen


  (Percocet


 5-325mg Tab)   @   Q4H  PRN


 PO  3/24/18 12:15


 4/4/18 10:59  3/29/18 07:50


1 TAB


 


 Sodium Chloride  1,000 ml @ 


 80 mls/hr  [id number]


 IV  3/24/18 22:00


 3/25/18 12:46 DC 3/25/18 10:41


80 MLS/HR


 


 Furosemide


  (Lasix Tab)  20 mg  NOW  STAT


 PO  3/25/18 13:08


 3/25/18 13:09 DC 3/25/18 13:29


20 MG


 


 Enteral


 Nutritional


 Formula


  (Boost Glucose


 Control)  1 can  BIDM


 PO  3/25/18 17:45


 4/24/18 17:44  3/29/18 08:30


1 CAN


 


 Furosemide


  (Lasix Tab)  20 mg  NOW  ONCE


 PO  3/27/18 15:15


 3/27/18 15:16 DC 3/27/18 15:33


20 MG


 


 Clopidogrel


 Bisulfate


  (plAVix TAB)  75 mg  QAM


 PO  3/29/18 09:00


 4/28/18 08:59  3/29/18 09:35


75 MG


 


 Potassium Chloride


  (Klor-Con Tab)  20 meq  ONE  ONCE


 PO  3/29/18 09:15


 3/29/18 09:17 DC 3/29/18 09:15


20 MEQ











Subjective


He states he feels somewhat better.  He states the abdominal pain is better now 

than before.


Review of Systems:


Constitutional:  Negative for night sweats, or fever


Eyes:  Negative for event change of vision


ENT:  Negative for epistaxis, nasal discharge, sore throat, or deafness


Cardiovascular:  Negative for chest pain, palpitations, dizziness, diaphoresis


Respiratory:  Negative for new shortness of breath,hemoptysis, or purulent cough


Gastrointestinal:  Negative for diarrhea, hematemesis, melena, nausea, vomiting

, or dyspepsia


Integumentary (skin):  Negative for rash or jaundice discoloration


Neurological: History of CVA


Lymphatic/Hematologic:  Negative for petechiae, bleeding or new adenopathy


Musculoskeletal:  Negative for new joint or back pain


Allergic/Immunologic:  Negative for unusual rash or pruritis.





Vital Signs





Vital Signs Past 12 Hours








  Date Time  Temp Pulse Resp B/P (MAP) Pulse Ox O2 Delivery O2 Flow Rate FiO2


 


3/29/18 08:08 36.9 104 18 138/78 (98) 94 Room Air  


 


3/29/18 07:40      Room Air  


 


3/28/18 23:42      Room Air  


 


3/28/18 23:20 36.4 105 16 136/83 (100) 96 Room Air  











Physical Exam


Constitutional: vitals are stable.


Eyes: Eyes are RADHA EOMI without conjuctival erythema or icterus.


ENT: External examination was negative for masses.


Neck: Negative for masses or palpable thyromegaly


Respiratory:  Lung sounds were generally clear bilaterally


Cardiovascular: Heart was RRR without significant murmur, gallops aoe rubs


Gastrointestinal: No palpable hepatic or splenomegaly. The abdomen was 

distended.  Incision appears well-healed staples remain in place


Lymphatic system:  there was no palpable peripheral lymphadenopathy


Musculoskeletal System:  The musculoskeletal system seemed concordant with age.


Skin: The skin was negative for jaundice.


Neurologic exam: History of CVA without further neurologic deficit


Extremities: Negative for edema





Laboratory





Last 24 Hours








Test


  3/28/18


12:17 3/28/18


16:48 3/28/18


18:45 3/28/18


20:45


 


Bedside Glucose 191 mg/dl  147 mg/dl   201 mg/dl 


 


Urine Color   YELLOW  


 


Urine Appearance   CLEAR  


 


Urine pH   6.0  


 


Urine Specific Gravity   1.025  


 


Urine Protein   1+  


 


Urine Glucose (UA)   TRACE  


 


Urine Ketones   TRACE  


 


Urine Occult Blood   TRACE  


 


Urine Nitrite   NEG  


 


Urine Bilirubin   NEG  


 


Urine Urobilinogen   POS  


 


Urine Leukocyte Esterase   NEG  


 


Urine RBC   0-4 /hpf  


 


Urine WBC   0 /hpf  


 


Urine Epithelial Cells   5-10 /lpf  


 


Urine Calcium Oxalate Crystals   PRESENT  


 


Urine Amorphous Sediment   PRESENT  


 


Urine Bacteria   NEG  


 


Urine Mucus   PRESENT  


 


Test


  3/29/18


07:48 3/29/18


08:15 


  


 


 


Bedside Glucose 114 mg/dl    


 


Sodium Level  131 mmol/L   


 


Potassium Level  3.4 mmol/L   


 


Chloride Level  98 mmol/L   


 


Carbon Dioxide Level  27 mmol/L   


 


Anion Gap  6.0 mmol/L   


 


Blood Urea Nitrogen  6 mg/dl   


 


Creatinine  0.25 mg/dl   


 


Est Creatinine Clear Calc


Drug Dose 


  338.2 ml/min 


  


  


 


 


Estimated GFR (


American) 


  > 150.0 


  


  


 


 


Estimated GFR (Non-


American 


  > 150.0 


  


  


 


 


BUN/Creatinine Ratio  24.5   


 


Random Glucose  129 mg/dl   


 


Calcium Level  7.9 mg/dl   


 


Total Bilirubin  0.5 mg/dl   


 


Direct Bilirubin  0.3 mg/dl   


 


Aspartate Amino Transf


(AST/SGOT) 


  102 U/L 


  


  


 


 


Alanine Aminotransferase


(ALT/SGPT) 


  97 U/L 


  


  


 


 


Alkaline Phosphatase  227 U/L   


 


Total Protein  5.3 gm/dl   


 


Albumin  1.5 gm/dl   


 


Globulin  3.8 gm/dl   


 


Albumin/Globulin Ratio  0.4   











Assessment & Plan


At least stage III Hodgkin's lymphoma





Bone marrow biopsy has been recorded as negative.  We will sign off for now.  

He does have a follow-up in our clinic next week to begin systemic therapy.  

Viral hepatitis studies will be ordered in preparation for that beginning.  

Please not hesitate to reconsult if needed.

## 2018-04-05 ENCOUNTER — HOSPITAL ENCOUNTER (OUTPATIENT)
Dept: HOSPITAL 45 - C.RC | Age: 53
Discharge: HOME | End: 2018-04-05
Attending: INTERNAL MEDICINE
Payer: COMMERCIAL

## 2018-04-05 DIAGNOSIS — C81.78: Primary | ICD-10-CM

## 2018-04-11 NOTE — PULMONARY FUNCTION TEST
Spirometry shows a severe reduction in forced vital capacity and FEV1.  There

is a normal FEV1/FVC ratio.  These results would be compatible with severe

restriction.  Review of the flow volume loops would suggest the patient did

not optimally perform the study.  Clinical correlation is advised.  Repeat

study done following bronchodilator showed no change in function.

 

Lung volumes showed a decreased FRC and TLC.  These would be compatible with

restriction.

## 2018-07-18 ENCOUNTER — HOSPITAL ENCOUNTER (OUTPATIENT)
Dept: HOSPITAL 45 - C.LABSPEC | Age: 53
Discharge: HOME | End: 2018-07-18
Attending: INTERNAL MEDICINE
Payer: COMMERCIAL

## 2018-07-18 DIAGNOSIS — C81.78: Primary | ICD-10-CM

## 2018-07-18 LAB
ALBUMIN SERPL-MCNC: 3.7 GM/DL (ref 3.4–5)
ALP SERPL-CCNC: 123 U/L (ref 45–117)
ALT SERPL-CCNC: 26 U/L (ref 12–78)
AST SERPL-CCNC: 13 U/L (ref 15–37)
BASOPHILS # BLD: 0.01 K/UL (ref 0–0.2)
BASOPHILS NFR BLD: 0.3 %
BUN SERPL-MCNC: 10 MG/DL (ref 7–18)
CALCIUM SERPL-MCNC: 8.9 MG/DL (ref 8.5–10.1)
CO2 SERPL-SCNC: 28 MMOL/L (ref 21–32)
CREAT SERPL-MCNC: 0.46 MG/DL (ref 0.6–1.4)
EOS ABS #: 0.08 K/UL (ref 0–0.5)
EOSINOPHIL NFR BLD AUTO: 198 K/UL (ref 130–400)
GLUCOSE SERPL-MCNC: 172 MG/DL (ref 70–99)
HCT VFR BLD CALC: 41.1 % (ref 42–52)
HGB BLD-MCNC: 13.8 G/DL (ref 14–18)
IG#: 0 K/UL (ref 0–0.02)
IMM GRANULOCYTES NFR BLD AUTO: 23.5 %
LYMPHOCYTES # BLD: 0.93 K/UL (ref 1.2–3.4)
MCH RBC QN AUTO: 30.3 PG (ref 25–34)
MCHC RBC AUTO-ENTMCNC: 33.6 G/DL (ref 32–36)
MCV RBC AUTO: 90.1 FL (ref 80–100)
MONO ABS #: 0.49 K/UL (ref 0.11–0.59)
MONOCYTES NFR BLD: 12.4 %
NEUT ABS #: 2.44 K/UL (ref 1.4–6.5)
NEUTROPHILS # BLD AUTO: 2 %
NEUTROPHILS NFR BLD AUTO: 61.8 %
PMV BLD AUTO: 10.3 FL (ref 7.4–10.4)
POTASSIUM SERPL-SCNC: 4 MMOL/L (ref 3.5–5.1)
PROT SERPL-MCNC: 6.7 GM/DL (ref 6.4–8.2)
RED CELL DISTRIBUTION WIDTH CV: 15.1 % (ref 11.5–14.5)
RED CELL DISTRIBUTION WIDTH SD: 48.9 FL (ref 36.4–46.3)
SODIUM SERPL-SCNC: 140 MMOL/L (ref 136–145)
WBC # BLD AUTO: 3.95 K/UL (ref 4.8–10.8)

## 2018-08-01 ENCOUNTER — HOSPITAL ENCOUNTER (OUTPATIENT)
Dept: HOSPITAL 45 - C.LABSPEC | Age: 53
Discharge: HOME | End: 2018-08-01
Attending: INTERNAL MEDICINE
Payer: COMMERCIAL

## 2018-08-01 DIAGNOSIS — C81.78: Primary | ICD-10-CM

## 2018-08-01 LAB
ALBUMIN SERPL-MCNC: 3.8 GM/DL (ref 3.4–5)
ALP SERPL-CCNC: 150 U/L (ref 45–117)
ALT SERPL-CCNC: 30 U/L (ref 12–78)
AST SERPL-CCNC: 15 U/L (ref 15–37)
BASOPHILS # BLD: 0.01 K/UL (ref 0–0.2)
BASOPHILS NFR BLD: 0.1 %
BUN SERPL-MCNC: 16 MG/DL (ref 7–18)
CALCIUM SERPL-MCNC: 8.7 MG/DL (ref 8.5–10.1)
CO2 SERPL-SCNC: 26 MMOL/L (ref 21–32)
CREAT SERPL-MCNC: 0.59 MG/DL (ref 0.6–1.4)
EOS ABS #: 0.05 K/UL (ref 0–0.5)
EOSINOPHIL NFR BLD AUTO: 159 K/UL (ref 130–400)
GLUCOSE SERPL-MCNC: 177 MG/DL (ref 70–99)
HCT VFR BLD CALC: 42.4 % (ref 42–52)
HGB BLD-MCNC: 14.3 G/DL (ref 14–18)
IG#: 0.05 K/UL (ref 0–0.02)
IMM GRANULOCYTES NFR BLD AUTO: 11.6 %
LYMPHOCYTES # BLD: 1.27 K/UL (ref 1.2–3.4)
MCH RBC QN AUTO: 30.4 PG (ref 25–34)
MCHC RBC AUTO-ENTMCNC: 33.7 G/DL (ref 32–36)
MCV RBC AUTO: 90 FL (ref 80–100)
MONO ABS #: 0.56 K/UL (ref 0.11–0.59)
MONOCYTES NFR BLD: 5.1 %
NEUT ABS #: 8.97 K/UL (ref 1.4–6.5)
NEUTROPHILS # BLD AUTO: 0.5 %
NEUTROPHILS NFR BLD AUTO: 82.2 %
PMV BLD AUTO: 11 FL (ref 7.4–10.4)
POTASSIUM SERPL-SCNC: 3.9 MMOL/L (ref 3.5–5.1)
PROT SERPL-MCNC: 7 GM/DL (ref 6.4–8.2)
RED CELL DISTRIBUTION WIDTH CV: 14.6 % (ref 11.5–14.5)
RED CELL DISTRIBUTION WIDTH SD: 47.6 FL (ref 36.4–46.3)
SODIUM SERPL-SCNC: 137 MMOL/L (ref 136–145)
WBC # BLD AUTO: 10.91 K/UL (ref 4.8–10.8)

## 2018-08-15 ENCOUNTER — HOSPITAL ENCOUNTER (OUTPATIENT)
Dept: HOSPITAL 45 - C.LABSPEC | Age: 53
Discharge: HOME | End: 2018-08-15
Attending: INTERNAL MEDICINE
Payer: COMMERCIAL

## 2018-08-15 DIAGNOSIS — C81.78: Primary | ICD-10-CM

## 2018-08-15 LAB
ALBUMIN SERPL-MCNC: 3.7 GM/DL (ref 3.4–5)
ALP SERPL-CCNC: 164 U/L (ref 45–117)
ALT SERPL-CCNC: 27 U/L (ref 12–78)
AST SERPL-CCNC: 13 U/L (ref 15–37)
BASOPHILS # BLD: 0.04 K/UL (ref 0–0.2)
BASOPHILS NFR BLD: 0.3 %
BUN SERPL-MCNC: 10 MG/DL (ref 7–18)
CALCIUM SERPL-MCNC: 8.7 MG/DL (ref 8.5–10.1)
CO2 SERPL-SCNC: 27 MMOL/L (ref 21–32)
CREAT SERPL-MCNC: 0.6 MG/DL (ref 0.6–1.4)
EOS ABS #: 0.09 K/UL (ref 0–0.5)
EOSINOPHIL NFR BLD AUTO: 197 K/UL (ref 130–400)
GLUCOSE SERPL-MCNC: 197 MG/DL (ref 70–99)
HCT VFR BLD CALC: 42.5 % (ref 42–52)
HGB BLD-MCNC: 14.4 G/DL (ref 14–18)
IG#: 0.76 K/UL (ref 0–0.02)
IMM GRANULOCYTES NFR BLD AUTO: 8.4 %
LYMPHOCYTES # BLD: 1.33 K/UL (ref 1.2–3.4)
MCH RBC QN AUTO: 30.9 PG (ref 25–34)
MCHC RBC AUTO-ENTMCNC: 33.9 G/DL (ref 32–36)
MCV RBC AUTO: 91.2 FL (ref 80–100)
MONO ABS #: 0.86 K/UL (ref 0.11–0.59)
MONOCYTES NFR BLD: 5.4 %
NEUT ABS #: 12.79 K/UL (ref 1.4–6.5)
NEUTROPHILS # BLD AUTO: 0.6 %
NEUTROPHILS NFR BLD AUTO: 80.5 %
PMV BLD AUTO: 11.1 FL (ref 7.4–10.4)
POTASSIUM SERPL-SCNC: 4 MMOL/L (ref 3.5–5.1)
PROT SERPL-MCNC: 6.9 GM/DL (ref 6.4–8.2)
RED CELL DISTRIBUTION WIDTH CV: 14.9 % (ref 11.5–14.5)
RED CELL DISTRIBUTION WIDTH SD: 49.3 FL (ref 36.4–46.3)
SODIUM SERPL-SCNC: 138 MMOL/L (ref 136–145)
WBC # BLD AUTO: 15.87 K/UL (ref 4.8–10.8)